# Patient Record
Sex: MALE | Race: WHITE | Employment: OTHER | URBAN - METROPOLITAN AREA
[De-identification: names, ages, dates, MRNs, and addresses within clinical notes are randomized per-mention and may not be internally consistent; named-entity substitution may affect disease eponyms.]

---

## 2021-01-01 ENCOUNTER — HOSPITAL ENCOUNTER (INPATIENT)
Facility: CLINIC | Age: 81
LOS: 3 days | DRG: 871 | End: 2021-09-03
Attending: EMERGENCY MEDICINE | Admitting: INTERNAL MEDICINE
Payer: MEDICARE

## 2021-01-01 ENCOUNTER — APPOINTMENT (OUTPATIENT)
Dept: CT IMAGING | Facility: CLINIC | Age: 81
End: 2021-01-01
Attending: NURSE PRACTITIONER
Payer: MEDICARE

## 2021-01-01 ENCOUNTER — HOSPITAL ENCOUNTER (EMERGENCY)
Facility: CLINIC | Age: 81
Discharge: HOME | End: 2021-05-21
Attending: NURSE PRACTITIONER | Admitting: NURSE PRACTITIONER
Payer: MEDICARE

## 2021-01-01 ENCOUNTER — APPOINTMENT (OUTPATIENT)
Dept: GENERAL RADIOLOGY | Facility: CLINIC | Age: 81
DRG: 871 | End: 2021-01-01
Attending: EMERGENCY MEDICINE
Payer: MEDICARE

## 2021-01-01 ENCOUNTER — MEDICAL CORRESPONDENCE (OUTPATIENT)
Dept: HEALTH INFORMATION MANAGEMENT | Facility: CLINIC | Age: 81
End: 2021-01-01

## 2021-01-01 ENCOUNTER — APPOINTMENT (OUTPATIENT)
Dept: SPEECH THERAPY | Facility: CLINIC | Age: 81
DRG: 871 | End: 2021-01-01
Attending: INTERNAL MEDICINE
Payer: MEDICARE

## 2021-01-01 ENCOUNTER — APPOINTMENT (OUTPATIENT)
Dept: CARDIOLOGY | Facility: CLINIC | Age: 81
DRG: 871 | End: 2021-01-01
Attending: INTERNAL MEDICINE
Payer: MEDICARE

## 2021-01-01 ENCOUNTER — LAB REQUISITION (OUTPATIENT)
Dept: LAB | Facility: CLINIC | Age: 81
End: 2021-01-01
Payer: MEDICARE

## 2021-01-01 VITALS
TEMPERATURE: 97.7 F | HEART RATE: 79 BPM | RESPIRATION RATE: 14 BRPM | OXYGEN SATURATION: 98 % | SYSTOLIC BLOOD PRESSURE: 147 MMHG | DIASTOLIC BLOOD PRESSURE: 74 MMHG

## 2021-01-01 VITALS
TEMPERATURE: 97.8 F | SYSTOLIC BLOOD PRESSURE: 60 MMHG | DIASTOLIC BLOOD PRESSURE: 30 MMHG | OXYGEN SATURATION: 95 % | HEART RATE: 70 BPM | RESPIRATION RATE: 26 BRPM | HEIGHT: 70 IN | BODY MASS INDEX: 17.52 KG/M2 | WEIGHT: 122.36 LBS

## 2021-01-01 DIAGNOSIS — D63.8 ANEMIA IN OTHER CHRONIC DISEASES CLASSIFIED ELSEWHERE: ICD-10-CM

## 2021-01-01 DIAGNOSIS — A41.9 SEPSIS, DUE TO UNSPECIFIED ORGANISM, UNSPECIFIED WHETHER ACUTE ORGAN DYSFUNCTION PRESENT (H): ICD-10-CM

## 2021-01-01 DIAGNOSIS — S00.01XA ABRASION OF SCALP, INITIAL ENCOUNTER: ICD-10-CM

## 2021-01-01 DIAGNOSIS — I95.1 ORTHOSTATIC HYPOTENSION: ICD-10-CM

## 2021-01-01 DIAGNOSIS — E87.29 RESPIRATORY ACIDOSIS: ICD-10-CM

## 2021-01-01 DIAGNOSIS — J96.01 ACUTE RESPIRATORY FAILURE WITH HYPOXIA (H): ICD-10-CM

## 2021-01-01 DIAGNOSIS — H74.91 MASTOID DISORDER, RIGHT: ICD-10-CM

## 2021-01-01 DIAGNOSIS — N17.9 ACUTE KIDNEY INJURY (H): ICD-10-CM

## 2021-01-01 DIAGNOSIS — S09.90XA CLOSED HEAD INJURY, INITIAL ENCOUNTER: ICD-10-CM

## 2021-01-01 DIAGNOSIS — J18.9 PNEUMONIA DUE TO INFECTIOUS ORGANISM, UNSPECIFIED LATERALITY, UNSPECIFIED PART OF LUNG: ICD-10-CM

## 2021-01-01 LAB
ALBUMIN SERPL-MCNC: 2 G/DL (ref 3.5–5)
ALBUMIN UR-MCNC: 20 MG/DL
ALP SERPL-CCNC: 62 U/L (ref 45–120)
ALT SERPL W P-5'-P-CCNC: <9 U/L (ref 0–45)
ANION GAP SERPL CALCULATED.3IONS-SCNC: 4 MMOL/L (ref 3–14)
ANION GAP SERPL CALCULATED.3IONS-SCNC: 4 MMOL/L (ref 3–14)
ANION GAP SERPL CALCULATED.3IONS-SCNC: 5 MMOL/L (ref 3–14)
ANION GAP SERPL CALCULATED.3IONS-SCNC: 8 MMOL/L (ref 3–14)
ANION GAP SERPL CALCULATED.3IONS-SCNC: 9 MMOL/L (ref 5–18)
APPEARANCE UR: CLEAR
APTT PPP: 36 SECONDS (ref 22–38)
AST SERPL W P-5'-P-CCNC: 7 U/L (ref 0–40)
BACTERIA #/AREA URNS HPF: ABNORMAL /HPF
BACTERIA BLD CULT: ABNORMAL
BACTERIA BLD CULT: ABNORMAL
BACTERIA UR CULT: NO GROWTH
BACTERIA UR CULT: NO GROWTH
BASE EXCESS BLDA CALC-SCNC: -4.7 MMOL/L (ref -9–1.8)
BASE EXCESS BLDA CALC-SCNC: -5.2 MMOL/L (ref -9–1.8)
BASE EXCESS BLDV CALC-SCNC: -4.9 MMOL/L (ref -7.7–1.9)
BASOPHILS # BLD AUTO: 0 10E3/UL (ref 0–0.2)
BASOPHILS # BLD AUTO: 0 10E3/UL (ref 0–0.2)
BASOPHILS NFR BLD AUTO: 0 %
BASOPHILS NFR BLD AUTO: 1 %
BILIRUB SERPL-MCNC: 0.5 MG/DL (ref 0–1)
BILIRUB UR QL STRIP: NEGATIVE
BUN SERPL-MCNC: 44 MG/DL (ref 7–30)
BUN SERPL-MCNC: 45 MG/DL (ref 7–30)
BUN SERPL-MCNC: 45 MG/DL (ref 7–30)
BUN SERPL-MCNC: 47 MG/DL (ref 7–30)
BUN SERPL-MCNC: 52 MG/DL (ref 8–28)
CALCIUM SERPL-MCNC: 8.1 MG/DL (ref 8.5–10.1)
CALCIUM SERPL-MCNC: 8.3 MG/DL (ref 8.5–10.5)
CALCIUM SERPL-MCNC: 8.5 MG/DL (ref 8.5–10.1)
CALCIUM SERPL-MCNC: 8.5 MG/DL (ref 8.5–10.1)
CALCIUM SERPL-MCNC: 8.6 MG/DL (ref 8.5–10.1)
CHLORIDE BLD-SCNC: 108 MMOL/L (ref 98–107)
CHLORIDE BLD-SCNC: 112 MMOL/L (ref 94–109)
CHLORIDE BLD-SCNC: 117 MMOL/L (ref 94–109)
CHLORIDE BLD-SCNC: 118 MMOL/L (ref 94–109)
CHLORIDE BLD-SCNC: 119 MMOL/L (ref 94–109)
CO2 SERPL-SCNC: 20 MMOL/L (ref 20–32)
CO2 SERPL-SCNC: 21 MMOL/L (ref 22–31)
CO2 SERPL-SCNC: 22 MMOL/L (ref 20–32)
CO2 SERPL-SCNC: 23 MMOL/L (ref 20–32)
CO2 SERPL-SCNC: 25 MMOL/L (ref 20–32)
COLOR UR AUTO: ABNORMAL
CREAT SERPL-MCNC: 2.71 MG/DL (ref 0.66–1.25)
CREAT SERPL-MCNC: 2.79 MG/DL (ref 0.66–1.25)
CREAT SERPL-MCNC: 2.86 MG/DL (ref 0.66–1.25)
CREAT SERPL-MCNC: 2.88 MG/DL (ref 0.66–1.25)
CREAT SERPL-MCNC: 3.03 MG/DL (ref 0.7–1.3)
ENTEROCOCCUS FAECALIS: NOT DETECTED
ENTEROCOCCUS FAECIUM: NOT DETECTED
EOSINOPHIL # BLD AUTO: 0 10E3/UL (ref 0–0.7)
EOSINOPHIL # BLD AUTO: 0.1 10E3/UL (ref 0–0.7)
EOSINOPHIL NFR BLD AUTO: 0 %
EOSINOPHIL NFR BLD AUTO: 2 %
ERYTHROCYTE [DISTWIDTH] IN BLOOD BY AUTOMATED COUNT: 12.8 % (ref 10–15)
ERYTHROCYTE [DISTWIDTH] IN BLOOD BY AUTOMATED COUNT: 14 % (ref 10–15)
ERYTHROCYTE [DISTWIDTH] IN BLOOD BY AUTOMATED COUNT: 14.3 % (ref 10–15)
ERYTHROCYTE [DISTWIDTH] IN BLOOD BY AUTOMATED COUNT: 14.4 % (ref 10–15)
ERYTHROCYTE [DISTWIDTH] IN BLOOD BY AUTOMATED COUNT: 14.4 % (ref 10–15)
FERRITIN SERPL-MCNC: 379 NG/ML (ref 27–300)
GFR SERPL CREATININE-BSD FRML MDRD: 18 ML/MIN/1.73M2
GFR SERPL CREATININE-BSD FRML MDRD: 19 ML/MIN/1.73M2
GFR SERPL CREATININE-BSD FRML MDRD: 19 ML/MIN/1.73M2
GFR SERPL CREATININE-BSD FRML MDRD: 20 ML/MIN/1.73M2
GFR SERPL CREATININE-BSD FRML MDRD: 21 ML/MIN/1.73M2
GLUCOSE BLD-MCNC: 63 MG/DL (ref 70–99)
GLUCOSE BLD-MCNC: 67 MG/DL (ref 70–125)
GLUCOSE BLD-MCNC: 96 MG/DL (ref 70–99)
GLUCOSE BLD-MCNC: 99 MG/DL (ref 70–99)
GLUCOSE BLD-MCNC: 99 MG/DL (ref 70–99)
GLUCOSE BLDC GLUCOMTR-MCNC: 64 MG/DL (ref 70–99)
GLUCOSE BLDC GLUCOMTR-MCNC: 80 MG/DL (ref 70–99)
GLUCOSE UR STRIP-MCNC: NEGATIVE MG/DL
HCO3 BLD-SCNC: 20 MMOL/L (ref 21–28)
HCO3 BLD-SCNC: 21 MMOL/L (ref 21–28)
HCO3 BLDV-SCNC: 17 MMOL/L (ref 21–28)
HCO3 BLDV-SCNC: 21 MMOL/L (ref 21–28)
HCO3 BLDV-SCNC: 23 MMOL/L (ref 21–28)
HCO3 BLDV-SCNC: 24 MMOL/L (ref 21–28)
HCT VFR BLD AUTO: 24.4 % (ref 40–53)
HCT VFR BLD AUTO: 25.3 % (ref 40–53)
HCT VFR BLD AUTO: 27.5 % (ref 40–53)
HCT VFR BLD AUTO: 27.7 % (ref 40–53)
HCT VFR BLD AUTO: 28.1 % (ref 40–53)
HGB BLD-MCNC: 7.8 G/DL (ref 13.3–17.7)
HGB BLD-MCNC: 8.3 G/DL (ref 13.3–17.7)
HGB BLD-MCNC: 8.6 G/DL (ref 13.3–17.7)
HGB BLD-MCNC: 8.9 G/DL (ref 13.3–17.7)
HGB BLD-MCNC: 9 G/DL (ref 13.3–17.7)
HGB UR QL STRIP: ABNORMAL
IMM GRANULOCYTES # BLD: 0 10E3/UL
IMM GRANULOCYTES # BLD: 0 10E3/UL
IMM GRANULOCYTES NFR BLD: 0 %
IMM GRANULOCYTES NFR BLD: 0 %
INR PPP: 1.33 (ref 0.85–1.15)
IRON SATN MFR SERPL: 52 % (ref 20–50)
IRON SERPL-MCNC: 41 UG/DL (ref 42–175)
KETONES UR STRIP-MCNC: NEGATIVE MG/DL
LACTATE BLD-SCNC: 0.7 MMOL/L
LACTATE BLD-SCNC: 1 MMOL/L
LACTATE BLD-SCNC: 1.1 MMOL/L
LACTATE SERPL-SCNC: 1.2 MMOL/L (ref 0.7–2)
LACTATE SERPL-SCNC: 1.6 MMOL/L (ref 0.7–2)
LEUKOCYTE ESTERASE UR QL STRIP: NEGATIVE
LISTERIA SPECIES (DETECTED/NOT DETECTED): NOT DETECTED
LVEF ECHO: NORMAL
LYMPHOCYTES # BLD AUTO: 0.7 10E3/UL (ref 0.8–5.3)
LYMPHOCYTES # BLD AUTO: 0.8 10E3/UL (ref 0.8–5.3)
LYMPHOCYTES NFR BLD AUTO: 13 %
LYMPHOCYTES NFR BLD AUTO: 17 %
MAGNESIUM SERPL-MCNC: 1.7 MG/DL (ref 1.6–2.3)
MCH RBC QN AUTO: 31 PG (ref 26.5–33)
MCH RBC QN AUTO: 31.3 PG (ref 26.5–33)
MCH RBC QN AUTO: 31.3 PG (ref 26.5–33)
MCH RBC QN AUTO: 31.5 PG (ref 26.5–33)
MCH RBC QN AUTO: 31.7 PG (ref 26.5–33)
MCHC RBC AUTO-ENTMCNC: 31 G/DL (ref 31.5–36.5)
MCHC RBC AUTO-ENTMCNC: 32 G/DL (ref 31.5–36.5)
MCHC RBC AUTO-ENTMCNC: 32 G/DL (ref 31.5–36.5)
MCHC RBC AUTO-ENTMCNC: 32.4 G/DL (ref 31.5–36.5)
MCHC RBC AUTO-ENTMCNC: 32.8 G/DL (ref 31.5–36.5)
MCV RBC AUTO: 100 FL (ref 78–100)
MCV RBC AUTO: 96 FL (ref 78–100)
MCV RBC AUTO: 98 FL (ref 78–100)
MONOCYTES # BLD AUTO: 0.2 10E3/UL (ref 0–1.3)
MONOCYTES # BLD AUTO: 0.3 10E3/UL (ref 0–1.3)
MONOCYTES NFR BLD AUTO: 4 %
MONOCYTES NFR BLD AUTO: 7 %
MUCOUS THREADS #/AREA URNS LPF: PRESENT /LPF
NEUTROPHILS # BLD AUTO: 3.1 10E3/UL (ref 1.6–8.3)
NEUTROPHILS # BLD AUTO: 4.8 10E3/UL (ref 1.6–8.3)
NEUTROPHILS NFR BLD AUTO: 73 %
NEUTROPHILS NFR BLD AUTO: 83 %
NITRATE UR QL: NEGATIVE
NRBC # BLD AUTO: 0 10E3/UL
NRBC # BLD AUTO: 0 10E3/UL
NRBC BLD AUTO-RTO: 0 /100
NRBC BLD AUTO-RTO: 0 /100
NT-PROBNP SERPL-MCNC: ABNORMAL PG/ML (ref 0–1800)
NT-PROBNP SERPL-MCNC: ABNORMAL PG/ML (ref 0–1800)
O2/TOTAL GAS SETTING VFR VENT: 2 %
O2/TOTAL GAS SETTING VFR VENT: 3 %
O2/TOTAL GAS SETTING VFR VENT: 30 %
OXYHGB MFR BLD: 96 % (ref 92–100)
OXYHGB MFR BLDV: 40 % (ref 70–75)
PCO2 BLD: 36 MM HG (ref 35–45)
PCO2 BLD: 38 MM HG (ref 35–45)
PCO2 BLDV: 37 MM HG (ref 40–50)
PCO2 BLDV: 41 MM HG (ref 40–50)
PCO2 BLDV: 49 MM HG (ref 40–50)
PCO2 BLDV: 64 MM HG (ref 40–50)
PH BLD: 7.34 [PH] (ref 7.35–7.45)
PH BLD: 7.35 [PH] (ref 7.35–7.45)
PH BLDV: 7.17 [PH] (ref 7.32–7.43)
PH BLDV: 7.27 [PH] (ref 7.32–7.43)
PH BLDV: 7.29 [PH] (ref 7.32–7.43)
PH BLDV: 7.32 [PH] (ref 7.32–7.43)
PH UR STRIP: 5 [PH] (ref 5–7)
PHOSPHATE SERPL-MCNC: 4.5 MG/DL (ref 2.5–4.5)
PLATELET # BLD AUTO: 137 10E3/UL (ref 150–450)
PLATELET # BLD AUTO: 142 10E3/UL (ref 150–450)
PLATELET # BLD AUTO: 145 10E3/UL (ref 150–450)
PLATELET # BLD AUTO: 151 10E3/UL (ref 150–450)
PLATELET # BLD AUTO: 164 10E3/UL (ref 150–450)
PO2 BLD: 110 MM HG (ref 80–105)
PO2 BLD: 74 MM HG (ref 80–105)
PO2 BLDV: 25 MM HG (ref 25–47)
PO2 BLDV: 25 MM HG (ref 25–47)
PO2 BLDV: 34 MM HG (ref 25–47)
PO2 BLDV: 52 MM HG (ref 25–47)
POTASSIUM BLD-SCNC: 3.4 MMOL/L (ref 3.4–5.3)
POTASSIUM BLD-SCNC: 3.5 MMOL/L (ref 3.4–5.3)
POTASSIUM BLD-SCNC: 3.8 MMOL/L (ref 3.4–5.3)
POTASSIUM BLD-SCNC: 3.8 MMOL/L (ref 3.5–5)
POTASSIUM BLD-SCNC: 3.9 MMOL/L (ref 3.4–5.3)
PROCALCITONIN SERPL-MCNC: 0.43 NG/ML
PROT SERPL-MCNC: 4.8 G/DL (ref 6–8)
RBC # BLD AUTO: 2.48 10E6/UL (ref 4.4–5.9)
RBC # BLD AUTO: 2.65 10E6/UL (ref 4.4–5.9)
RBC # BLD AUTO: 2.77 10E6/UL (ref 4.4–5.9)
RBC # BLD AUTO: 2.81 10E6/UL (ref 4.4–5.9)
RBC # BLD AUTO: 2.88 10E6/UL (ref 4.4–5.9)
RBC URINE: 8 /HPF
RETICS # AUTO: 0.02 10E6/UL (ref 0.01–0.11)
RETICS/RBC NFR AUTO: 0.9 % (ref 0.8–2.7)
SAO2 % BLDV: 37 % (ref 94–100)
SAO2 % BLDV: 59 % (ref 94–100)
SAO2 % BLDV: 75 % (ref 94–100)
SARS-COV-2 RNA RESP QL NAA+PROBE: NEGATIVE
SODIUM SERPL-SCNC: 138 MMOL/L (ref 136–145)
SODIUM SERPL-SCNC: 141 MMOL/L (ref 133–144)
SODIUM SERPL-SCNC: 144 MMOL/L (ref 133–144)
SODIUM SERPL-SCNC: 146 MMOL/L (ref 133–144)
SODIUM SERPL-SCNC: 146 MMOL/L (ref 133–144)
SP GR UR STRIP: 1.01 (ref 1–1.03)
SQUAMOUS EPITHELIAL: 3 /HPF
STAPHYLOCOCCUS AUREUS: DETECTED
STAPHYLOCOCCUS EPIDERMIDIS: NOT DETECTED
STAPHYLOCOCCUS LUGDUNENSIS: NOT DETECTED
STREPTOCOCCUS AGALACTIAE: NOT DETECTED
STREPTOCOCCUS ANGINOSUS GROUP: NOT DETECTED
STREPTOCOCCUS PNEUMONIAE: NOT DETECTED
STREPTOCOCCUS PYOGENES: NOT DETECTED
STREPTOCOCCUS SPECIES: NOT DETECTED
TIBC SERPL-MCNC: 79 UG/DL (ref 313–563)
TRANSFERRIN SERPL-MCNC: 63 MG/DL (ref 212–360)
TROPONIN I SERPL-MCNC: 0.08 UG/L (ref 0–0.04)
TROPONIN I SERPL-MCNC: 0.15 UG/L (ref 0–0.04)
TROPONIN I SERPL-MCNC: 0.19 UG/L (ref 0–0.04)
TROPONIN I SERPL-MCNC: 0.27 UG/L (ref 0–0.04)
UROBILINOGEN UR STRIP-MCNC: NORMAL MG/DL
VANCOMYCIN SERPL-MCNC: 10.6 MG/L
WBC # BLD AUTO: 4.2 10E3/UL (ref 4–11)
WBC # BLD AUTO: 5.8 10E3/UL (ref 4–11)
WBC # BLD AUTO: 7.9 10E3/UL (ref 4–11)
WBC # BLD AUTO: 8.9 10E3/UL (ref 4–11)
WBC # BLD AUTO: 9.6 10E3/UL (ref 4–11)
WBC URINE: 12 /HPF

## 2021-01-01 PROCEDURE — 250N000011 HC RX IP 250 OP 636: Performed by: NURSE PRACTITIONER

## 2021-01-01 PROCEDURE — 93306 TTE W/DOPPLER COMPLETE: CPT

## 2021-01-01 PROCEDURE — 80048 BASIC METABOLIC PNL TOTAL CA: CPT | Performed by: EMERGENCY MEDICINE

## 2021-01-01 PROCEDURE — 80048 BASIC METABOLIC PNL TOTAL CA: CPT | Performed by: NURSE PRACTITIONER

## 2021-01-01 PROCEDURE — 250N000011 HC RX IP 250 OP 636: Performed by: HOSPITALIST

## 2021-01-01 PROCEDURE — 94660 CPAP INITIATION&MGMT: CPT

## 2021-01-01 PROCEDURE — 87040 BLOOD CULTURE FOR BACTERIA: CPT | Performed by: INTERNAL MEDICINE

## 2021-01-01 PROCEDURE — 5A09357 ASSISTANCE WITH RESPIRATORY VENTILATION, LESS THAN 24 CONSECUTIVE HOURS, CONTINUOUS POSITIVE AIRWAY PRESSURE: ICD-10-PCS | Performed by: INTERNAL MEDICINE

## 2021-01-01 PROCEDURE — 90715 TDAP VACCINE 7 YRS/> IM: CPT | Performed by: NURSE PRACTITIONER

## 2021-01-01 PROCEDURE — 250N000013 HC RX MED GY IP 250 OP 250 PS 637: Performed by: INTERNAL MEDICINE

## 2021-01-01 PROCEDURE — 70450 CT HEAD/BRAIN W/O DYE: CPT

## 2021-01-01 PROCEDURE — C9803 HOPD COVID-19 SPEC COLLECT: HCPCS

## 2021-01-01 PROCEDURE — 250N000013 HC RX MED GY IP 250 OP 250 PS 637: Performed by: HOSPITALIST

## 2021-01-01 PROCEDURE — 85045 AUTOMATED RETICULOCYTE COUNT: CPT | Mod: ORL | Performed by: HOSPITALIST

## 2021-01-01 PROCEDURE — 250N000011 HC RX IP 250 OP 636: Performed by: INTERNAL MEDICINE

## 2021-01-01 PROCEDURE — 84484 ASSAY OF TROPONIN QUANT: CPT | Performed by: INTERNAL MEDICINE

## 2021-01-01 PROCEDURE — 93005 ELECTROCARDIOGRAM TRACING: CPT

## 2021-01-01 PROCEDURE — 82728 ASSAY OF FERRITIN: CPT | Mod: ORL | Performed by: HOSPITALIST

## 2021-01-01 PROCEDURE — 99292 CRITICAL CARE ADDL 30 MIN: CPT

## 2021-01-01 PROCEDURE — 71045 X-RAY EXAM CHEST 1 VIEW: CPT

## 2021-01-01 PROCEDURE — 36415 COLL VENOUS BLD VENIPUNCTURE: CPT | Performed by: INTERNAL MEDICINE

## 2021-01-01 PROCEDURE — 93306 TTE W/DOPPLER COMPLETE: CPT | Mod: 26 | Performed by: INTERNAL MEDICINE

## 2021-01-01 PROCEDURE — 85025 COMPLETE CBC W/AUTO DIFF WBC: CPT | Performed by: EMERGENCY MEDICINE

## 2021-01-01 PROCEDURE — 250N000011 HC RX IP 250 OP 636: Performed by: SPECIALIST

## 2021-01-01 PROCEDURE — 87149 DNA/RNA DIRECT PROBE: CPT | Performed by: EMERGENCY MEDICINE

## 2021-01-01 PROCEDURE — 84466 ASSAY OF TRANSFERRIN: CPT | Mod: ORL | Performed by: HOSPITALIST

## 2021-01-01 PROCEDURE — 99233 SBSQ HOSP IP/OBS HIGH 50: CPT | Performed by: INTERNAL MEDICINE

## 2021-01-01 PROCEDURE — P9604 ONE-WAY ALLOW PRORATED TRIP: HCPCS | Mod: ORL | Performed by: HOSPITALIST

## 2021-01-01 PROCEDURE — 999N000157 HC STATISTIC RCP TIME EA 10 MIN

## 2021-01-01 PROCEDURE — 258N000003 HC RX IP 258 OP 636: Performed by: INTERNAL MEDICINE

## 2021-01-01 PROCEDURE — 120N000001 HC R&B MED SURG/OB

## 2021-01-01 PROCEDURE — 80202 ASSAY OF VANCOMYCIN: CPT | Performed by: INTERNAL MEDICINE

## 2021-01-01 PROCEDURE — 85025 COMPLETE CBC W/AUTO DIFF WBC: CPT | Mod: ORL | Performed by: HOSPITALIST

## 2021-01-01 PROCEDURE — 85610 PROTHROMBIN TIME: CPT | Performed by: EMERGENCY MEDICINE

## 2021-01-01 PROCEDURE — 272N000064 HC CIRCUIT HUMIDITY W/CPAP BIPAP

## 2021-01-01 PROCEDURE — 258N000003 HC RX IP 258 OP 636: Performed by: NURSE PRACTITIONER

## 2021-01-01 PROCEDURE — 83605 ASSAY OF LACTIC ACID: CPT | Performed by: EMERGENCY MEDICINE

## 2021-01-01 PROCEDURE — 99223 1ST HOSP IP/OBS HIGH 75: CPT | Mod: AI | Performed by: INTERNAL MEDICINE

## 2021-01-01 PROCEDURE — 84145 PROCALCITONIN (PCT): CPT | Performed by: EMERGENCY MEDICINE

## 2021-01-01 PROCEDURE — 36415 COLL VENOUS BLD VENIPUNCTURE: CPT | Performed by: NURSE PRACTITIONER

## 2021-01-01 PROCEDURE — 87635 SARS-COV-2 COVID-19 AMP PRB: CPT | Performed by: EMERGENCY MEDICINE

## 2021-01-01 PROCEDURE — 85730 THROMBOPLASTIN TIME PARTIAL: CPT | Performed by: EMERGENCY MEDICINE

## 2021-01-01 PROCEDURE — G0463 HOSPITAL OUTPT CLINIC VISIT: HCPCS

## 2021-01-01 PROCEDURE — 90471 IMMUNIZATION ADMIN: CPT | Performed by: NURSE PRACTITIONER

## 2021-01-01 PROCEDURE — 99291 CRITICAL CARE FIRST HOUR: CPT | Performed by: NURSE PRACTITIONER

## 2021-01-01 PROCEDURE — 84484 ASSAY OF TROPONIN QUANT: CPT | Performed by: NURSE PRACTITIONER

## 2021-01-01 PROCEDURE — 82803 BLOOD GASES ANY COMBINATION: CPT

## 2021-01-01 PROCEDURE — 84484 ASSAY OF TROPONIN QUANT: CPT | Performed by: EMERGENCY MEDICINE

## 2021-01-01 PROCEDURE — 99284 EMERGENCY DEPT VISIT MOD MDM: CPT | Mod: 25

## 2021-01-01 PROCEDURE — 96365 THER/PROPH/DIAG IV INF INIT: CPT

## 2021-01-01 PROCEDURE — 85027 COMPLETE CBC AUTOMATED: CPT | Performed by: INTERNAL MEDICINE

## 2021-01-01 PROCEDURE — 83880 ASSAY OF NATRIURETIC PEPTIDE: CPT | Performed by: EMERGENCY MEDICINE

## 2021-01-01 PROCEDURE — 80048 BASIC METABOLIC PNL TOTAL CA: CPT | Performed by: INTERNAL MEDICINE

## 2021-01-01 PROCEDURE — 83735 ASSAY OF MAGNESIUM: CPT | Performed by: NURSE PRACTITIONER

## 2021-01-01 PROCEDURE — 258N000003 HC RX IP 258 OP 636: Performed by: EMERGENCY MEDICINE

## 2021-01-01 PROCEDURE — 87086 URINE CULTURE/COLONY COUNT: CPT | Performed by: EMERGENCY MEDICINE

## 2021-01-01 PROCEDURE — 81001 URINALYSIS AUTO W/SCOPE: CPT | Performed by: EMERGENCY MEDICINE

## 2021-01-01 PROCEDURE — 36415 COLL VENOUS BLD VENIPUNCTURE: CPT | Performed by: EMERGENCY MEDICINE

## 2021-01-01 PROCEDURE — 99292 CRITICAL CARE ADDL 30 MIN: CPT | Performed by: NURSE PRACTITIONER

## 2021-01-01 PROCEDURE — 92610 EVALUATE SWALLOWING FUNCTION: CPT | Mod: GN

## 2021-01-01 PROCEDURE — 36600 WITHDRAWAL OF ARTERIAL BLOOD: CPT

## 2021-01-01 PROCEDURE — 80053 COMPREHEN METABOLIC PANEL: CPT | Mod: ORL | Performed by: HOSPITALIST

## 2021-01-01 PROCEDURE — 82803 BLOOD GASES ANY COMBINATION: CPT | Performed by: INTERNAL MEDICINE

## 2021-01-01 PROCEDURE — 82805 BLOOD GASES W/O2 SATURATION: CPT | Performed by: INTERNAL MEDICINE

## 2021-01-01 PROCEDURE — 250N000009 HC RX 250: Performed by: NURSE PRACTITIONER

## 2021-01-01 PROCEDURE — 83605 ASSAY OF LACTIC ACID: CPT | Performed by: NURSE PRACTITIONER

## 2021-01-01 PROCEDURE — 87186 SC STD MICRODIL/AGAR DIL: CPT | Performed by: EMERGENCY MEDICINE

## 2021-01-01 PROCEDURE — 250N000011 HC RX IP 250 OP 636: Performed by: EMERGENCY MEDICINE

## 2021-01-01 PROCEDURE — 258N000001 HC RX 258: Performed by: INTERNAL MEDICINE

## 2021-01-01 PROCEDURE — 84100 ASSAY OF PHOSPHORUS: CPT | Performed by: NURSE PRACTITIONER

## 2021-01-01 PROCEDURE — 99291 CRITICAL CARE FIRST HOUR: CPT | Mod: 25

## 2021-01-01 PROCEDURE — 85014 HEMATOCRIT: CPT | Performed by: INTERNAL MEDICINE

## 2021-01-01 PROCEDURE — 36415 COLL VENOUS BLD VENIPUNCTURE: CPT | Mod: ORL | Performed by: HOSPITALIST

## 2021-01-01 RX ORDER — NITROGLYCERIN 0.4 MG/1
0.4 TABLET SUBLINGUAL EVERY 5 MIN PRN
Status: DISCONTINUED | OUTPATIENT
Start: 2021-01-01 | End: 2021-01-01 | Stop reason: HOSPADM

## 2021-01-01 RX ORDER — CARBIDOPA AND LEVODOPA 25; 100 MG/1; MG/1
1.5 TABLET, ORALLY DISINTEGRATING ORAL 4 TIMES DAILY
COMMUNITY
Start: 2021-01-01

## 2021-01-01 RX ORDER — METOPROLOL TARTRATE 1 MG/ML
2.5 INJECTION, SOLUTION INTRAVENOUS ONCE
Status: COMPLETED | OUTPATIENT
Start: 2021-01-01 | End: 2021-01-01

## 2021-01-01 RX ORDER — MAGNESIUM SULFATE HEPTAHYDRATE 40 MG/ML
2 INJECTION, SOLUTION INTRAVENOUS ONCE
Status: COMPLETED | OUTPATIENT
Start: 2021-01-01 | End: 2021-01-01

## 2021-01-01 RX ORDER — CALCITRIOL 0.25 UG/1
CAPSULE, LIQUID FILLED ORAL
COMMUNITY
Start: 2020-04-28 | End: 2021-01-01

## 2021-01-01 RX ORDER — FERROUS SULFATE 325(65) MG
325 TABLET ORAL
COMMUNITY
Start: 2021-01-01

## 2021-01-01 RX ORDER — DEXTROSE MONOHYDRATE 50 MG/ML
INJECTION, SOLUTION INTRAVENOUS CONTINUOUS
Status: DISCONTINUED | OUTPATIENT
Start: 2021-01-01 | End: 2021-01-01 | Stop reason: HOSPADM

## 2021-01-01 RX ORDER — SENNOSIDES A AND B 8.6 MG/1
8.6 TABLET, FILM COATED ORAL DAILY PRN
COMMUNITY

## 2021-01-01 RX ORDER — FLUTICASONE PROPIONATE 50 MCG
2 SPRAY, SUSPENSION (ML) NASAL DAILY
COMMUNITY
Start: 2021-01-01

## 2021-01-01 RX ORDER — BISACODYL 10 MG
10 SUPPOSITORY, RECTAL RECTAL DAILY PRN
Status: DISCONTINUED | OUTPATIENT
Start: 2021-01-01 | End: 2021-01-01 | Stop reason: HOSPADM

## 2021-01-01 RX ORDER — ONDANSETRON 4 MG/1
4 TABLET, ORALLY DISINTEGRATING ORAL EVERY 6 HOURS PRN
Status: DISCONTINUED | OUTPATIENT
Start: 2021-01-01 | End: 2021-01-01 | Stop reason: HOSPADM

## 2021-01-01 RX ORDER — VANCOMYCIN HYDROCHLORIDE 1 G/200ML
1000 INJECTION, SOLUTION INTRAVENOUS ONCE
Status: COMPLETED | OUTPATIENT
Start: 2021-01-01 | End: 2021-01-01

## 2021-01-01 RX ORDER — ONDANSETRON 2 MG/ML
4 INJECTION INTRAMUSCULAR; INTRAVENOUS EVERY 6 HOURS PRN
Status: DISCONTINUED | OUTPATIENT
Start: 2021-01-01 | End: 2021-01-01 | Stop reason: HOSPADM

## 2021-01-01 RX ORDER — FLUTICASONE PROPIONATE 50 MCG
2 SPRAY, SUSPENSION (ML) NASAL DAILY
Status: DISCONTINUED | OUTPATIENT
Start: 2021-01-01 | End: 2021-01-01 | Stop reason: HOSPADM

## 2021-01-01 RX ORDER — LIDOCAINE 40 MG/G
CREAM TOPICAL
Status: DISCONTINUED | OUTPATIENT
Start: 2021-01-01 | End: 2021-01-01

## 2021-01-01 RX ORDER — CARBIDOPA AND LEVODOPA 25; 100 MG/1; MG/1
1 TABLET, ORALLY DISINTEGRATING ORAL 4 TIMES DAILY
Status: DISCONTINUED | OUTPATIENT
Start: 2021-01-01 | End: 2021-01-01 | Stop reason: HOSPADM

## 2021-01-01 RX ORDER — ACETAMINOPHEN 500 MG
500 TABLET ORAL EVERY 8 HOURS PRN
COMMUNITY

## 2021-01-01 RX ORDER — AMOXICILLIN 250 MG
1-2 CAPSULE ORAL 2 TIMES DAILY PRN
Status: DISCONTINUED | OUTPATIENT
Start: 2021-01-01 | End: 2021-01-01 | Stop reason: HOSPADM

## 2021-01-01 RX ORDER — OLANZAPINE 10 MG/2ML
5 INJECTION, POWDER, FOR SOLUTION INTRAMUSCULAR
Status: COMPLETED | OUTPATIENT
Start: 2021-01-01 | End: 2021-01-01

## 2021-01-01 RX ORDER — LANOLIN ALCOHOL/MO/W.PET/CERES
3 CREAM (GRAM) TOPICAL AT BEDTIME
COMMUNITY
Start: 2021-01-01

## 2021-01-01 RX ORDER — HEPARIN SODIUM 5000 [USP'U]/.5ML
5000 INJECTION, SOLUTION INTRAVENOUS; SUBCUTANEOUS EVERY 12 HOURS
Status: DISCONTINUED | OUTPATIENT
Start: 2021-01-01 | End: 2021-01-01 | Stop reason: HOSPADM

## 2021-01-01 RX ORDER — SODIUM CHLORIDE 9 MG/ML
INJECTION, SOLUTION INTRAVENOUS CONTINUOUS
Status: DISCONTINUED | OUTPATIENT
Start: 2021-01-01 | End: 2021-01-01

## 2021-01-01 RX ORDER — AMPICILLIN AND SULBACTAM 2; 1 G/1; G/1
3 INJECTION, POWDER, FOR SOLUTION INTRAMUSCULAR; INTRAVENOUS EVERY 12 HOURS
Status: DISCONTINUED | OUTPATIENT
Start: 2021-01-01 | End: 2021-01-01 | Stop reason: HOSPADM

## 2021-01-01 RX ORDER — CARBOXYMETHYLCELLULOSE SODIUM 5 MG/ML
1 SOLUTION/ DROPS OPHTHALMIC
Status: DISCONTINUED | OUTPATIENT
Start: 2021-01-01 | End: 2021-01-01 | Stop reason: HOSPADM

## 2021-01-01 RX ORDER — PIPERACILLIN SODIUM, TAZOBACTAM SODIUM 4; .5 G/20ML; G/20ML
4.5 INJECTION, POWDER, LYOPHILIZED, FOR SOLUTION INTRAVENOUS ONCE
Status: COMPLETED | OUTPATIENT
Start: 2021-01-01 | End: 2021-01-01

## 2021-01-01 RX ORDER — OXYBUTYNIN CHLORIDE 5 MG/1
5 TABLET ORAL
COMMUNITY
Start: 2020-02-03

## 2021-01-01 RX ORDER — FERROUS SULFATE 325(65) MG
325 TABLET ORAL DAILY
Status: DISCONTINUED | OUTPATIENT
Start: 2021-01-01 | End: 2021-01-01 | Stop reason: HOSPADM

## 2021-01-01 RX ORDER — PIPERACILLIN SODIUM, TAZOBACTAM SODIUM 2; .25 G/10ML; G/10ML
2.25 INJECTION, POWDER, LYOPHILIZED, FOR SOLUTION INTRAVENOUS EVERY 6 HOURS
Status: DISCONTINUED | OUTPATIENT
Start: 2021-01-01 | End: 2021-01-01

## 2021-01-01 RX ORDER — OLANZAPINE 2.5 MG/1
2.5-5 TABLET, FILM COATED ORAL
Status: DISCONTINUED | OUTPATIENT
Start: 2021-01-01 | End: 2021-01-01 | Stop reason: HOSPADM

## 2021-01-01 RX ORDER — CARBIDOPA AND LEVODOPA 10; 100 MG/1; MG/1
1 TABLET, ORALLY DISINTEGRATING ORAL 4 TIMES DAILY
Status: DISCONTINUED | OUTPATIENT
Start: 2021-01-01 | End: 2021-01-01

## 2021-01-01 RX ORDER — LIDOCAINE 40 MG/G
CREAM TOPICAL
Status: DISCONTINUED | OUTPATIENT
Start: 2021-01-01 | End: 2021-01-01 | Stop reason: HOSPADM

## 2021-01-01 RX ORDER — LANOLIN ALCOHOL/MO/W.PET/CERES
3 CREAM (GRAM) TOPICAL
COMMUNITY

## 2021-01-01 RX ORDER — AMPICILLIN AND SULBACTAM 2; 1 G/1; G/1
3 INJECTION, POWDER, FOR SOLUTION INTRAMUSCULAR; INTRAVENOUS EVERY 24 HOURS
Status: DISCONTINUED | OUTPATIENT
Start: 2021-01-01 | End: 2021-01-01

## 2021-01-01 RX ORDER — CARBIDOPA AND LEVODOPA 25; 100 MG/1; MG/1
1.5 TABLET, ORALLY DISINTEGRATING ORAL 4 TIMES DAILY
Status: DISCONTINUED | OUTPATIENT
Start: 2021-01-01 | End: 2021-01-01 | Stop reason: ALTCHOICE

## 2021-01-01 RX ORDER — POTASSIUM CHLORIDE 7.45 MG/ML
10 INJECTION INTRAVENOUS
Status: COMPLETED | OUTPATIENT
Start: 2021-01-01 | End: 2021-01-01

## 2021-01-01 RX ORDER — OMEPRAZOLE 40 MG/1
40 CAPSULE, DELAYED RELEASE ORAL
COMMUNITY
Start: 2021-01-01

## 2021-01-01 RX ORDER — ACETAMINOPHEN 650 MG/1
650 SUPPOSITORY RECTAL EVERY 6 HOURS PRN
Status: DISCONTINUED | OUTPATIENT
Start: 2021-01-01 | End: 2021-01-01 | Stop reason: HOSPADM

## 2021-01-01 RX ADMIN — HEPARIN SODIUM 5000 UNITS: 5000 INJECTION INTRAVENOUS; SUBCUTANEOUS at 08:34

## 2021-01-01 RX ADMIN — PIPERACILLIN SODIUM AND TAZOBACTAM SODIUM 2.25 G: 2; .25 INJECTION, POWDER, LYOPHILIZED, FOR SOLUTION INTRAVENOUS at 20:56

## 2021-01-01 RX ADMIN — PIPERACILLIN SODIUM AND TAZOBACTAM SODIUM 2.25 G: 2; .25 INJECTION, POWDER, LYOPHILIZED, FOR SOLUTION INTRAVENOUS at 02:19

## 2021-01-01 RX ADMIN — OLANZAPINE 5 MG: 10 INJECTION, POWDER, FOR SOLUTION INTRAMUSCULAR at 21:24

## 2021-01-01 RX ADMIN — AMPICILLIN SODIUM AND SULBACTAM SODIUM 3 G: 2; 1 INJECTION, POWDER, FOR SOLUTION INTRAMUSCULAR; INTRAVENOUS at 17:15

## 2021-01-01 RX ADMIN — VANCOMYCIN HYDROCHLORIDE 1000 MG: 1 INJECTION, SOLUTION INTRAVENOUS at 09:56

## 2021-01-01 RX ADMIN — CARBIDOPA AND LEVODOPA 1 TABLET: 25; 100 TABLET, ORALLY DISINTEGRATING ORAL at 21:43

## 2021-01-01 RX ADMIN — POTASSIUM CHLORIDE 10 MEQ: 7.46 INJECTION, SOLUTION INTRAVENOUS at 03:16

## 2021-01-01 RX ADMIN — METOPROLOL TARTRATE 2.5 MG: 5 INJECTION INTRAVENOUS at 02:05

## 2021-01-01 RX ADMIN — VANCOMYCIN HYDROCHLORIDE 750 MG: 1 INJECTION, POWDER, LYOPHILIZED, FOR SOLUTION INTRAVENOUS at 09:24

## 2021-01-01 RX ADMIN — PIPERACILLIN SODIUM AND TAZOBACTAM SODIUM 2.25 G: 2; .25 INJECTION, POWDER, LYOPHILIZED, FOR SOLUTION INTRAVENOUS at 13:28

## 2021-01-01 RX ADMIN — PIPERACILLIN SODIUM AND TAZOBACTAM SODIUM 2.25 G: 2; .25 INJECTION, POWDER, LYOPHILIZED, FOR SOLUTION INTRAVENOUS at 14:01

## 2021-01-01 RX ADMIN — HEPARIN SODIUM 5000 UNITS: 5000 INJECTION INTRAVENOUS; SUBCUTANEOUS at 08:32

## 2021-01-01 RX ADMIN — CARBIDOPA AND LEVODOPA 1 HALF-TAB: 25; 100 TABLET ORAL at 12:20

## 2021-01-01 RX ADMIN — CARBIDOPA AND LEVODOPA 1 TABLET: 25; 100 TABLET, ORALLY DISINTEGRATING ORAL at 08:09

## 2021-01-01 RX ADMIN — MAGNESIUM SULFATE HEPTAHYDRATE 2 G: 40 INJECTION, SOLUTION INTRAVENOUS at 01:04

## 2021-01-01 RX ADMIN — DEXTROSE AND SODIUM CHLORIDE: 5; 450 INJECTION, SOLUTION INTRAVENOUS at 10:15

## 2021-01-01 RX ADMIN — SODIUM CHLORIDE: 9 INJECTION, SOLUTION INTRAVENOUS at 14:00

## 2021-01-01 RX ADMIN — FLUTICASONE PROPIONATE 2 SPRAY: 50 SPRAY, METERED NASAL at 11:26

## 2021-01-01 RX ADMIN — MAGNESIUM SULFATE HEPTAHYDRATE 2 G: 40 INJECTION, SOLUTION INTRAVENOUS at 02:16

## 2021-01-01 RX ADMIN — SODIUM CHLORIDE 250 ML: 9 INJECTION, SOLUTION INTRAVENOUS at 00:57

## 2021-01-01 RX ADMIN — PIPERACILLIN SODIUM AND TAZOBACTAM SODIUM 2.25 G: 2; .25 INJECTION, POWDER, LYOPHILIZED, FOR SOLUTION INTRAVENOUS at 04:46

## 2021-01-01 RX ADMIN — HEPARIN SODIUM 5000 UNITS: 5000 INJECTION INTRAVENOUS; SUBCUTANEOUS at 08:17

## 2021-01-01 RX ADMIN — HEPARIN SODIUM 5000 UNITS: 5000 INJECTION INTRAVENOUS; SUBCUTANEOUS at 21:00

## 2021-01-01 RX ADMIN — CARBIDOPA AND LEVODOPA 1 HALF-TAB: 25; 100 TABLET ORAL at 21:43

## 2021-01-01 RX ADMIN — CLOSTRIDIUM TETANI TOXOID ANTIGEN (FORMALDEHYDE INACTIVATED), CORYNEBACTERIUM DIPHTHERIAE TOXOID ANTIGEN (FORMALDEHYDE INACTIVATED), BORDETELLA PERTUSSIS TOXOID ANTIGEN (GLUTARALDEHYDE INACTIVATED), BORDETELLA PERTUSSIS FILAMENTOUS HEMAGGLUTININ ANTIGEN (FORMALDEHYDE INACTIVATED), BORDETELLA PERTUSSIS PERTACTIN ANTIGEN, AND BORDETELLA PERTUSSIS FIMBRIAE 2/3 ANTIGEN 0.5 ML: 5; 2; 2.5; 5; 3; 5 INJECTION, SUSPENSION INTRAMUSCULAR at 17:47

## 2021-01-01 RX ADMIN — SODIUM CHLORIDE: 9 INJECTION, SOLUTION INTRAVENOUS at 00:39

## 2021-01-01 RX ADMIN — PIPERACILLIN SODIUM AND TAZOBACTAM SODIUM 2.25 G: 2; .25 INJECTION, POWDER, LYOPHILIZED, FOR SOLUTION INTRAVENOUS at 15:57

## 2021-01-01 RX ADMIN — DEXTROSE MONOHYDRATE: 50 INJECTION, SOLUTION INTRAVENOUS at 02:34

## 2021-01-01 RX ADMIN — CARBIDOPA AND LEVODOPA 1 TABLET: 25; 100 TABLET, ORALLY DISINTEGRATING ORAL at 15:26

## 2021-01-01 RX ADMIN — DEXTROSE MONOHYDRATE: 50 INJECTION, SOLUTION INTRAVENOUS at 15:42

## 2021-01-01 RX ADMIN — POTASSIUM CHLORIDE 10 MEQ: 7.46 INJECTION, SOLUTION INTRAVENOUS at 02:07

## 2021-01-01 RX ADMIN — PIPERACILLIN SODIUM AND TAZOBACTAM SODIUM 2.25 G: 2; .25 INJECTION, POWDER, LYOPHILIZED, FOR SOLUTION INTRAVENOUS at 08:32

## 2021-01-01 RX ADMIN — CARBIDOPA AND LEVODOPA 1 TABLET: 25; 100 TABLET, ORALLY DISINTEGRATING ORAL at 17:16

## 2021-01-01 RX ADMIN — PIPERACILLIN SODIUM AND TAZOBACTAM SODIUM 2.25 G: 2; .25 INJECTION, POWDER, LYOPHILIZED, FOR SOLUTION INTRAVENOUS at 21:10

## 2021-01-01 RX ADMIN — SODIUM CHLORIDE, POTASSIUM CHLORIDE, SODIUM LACTATE AND CALCIUM CHLORIDE 1000 ML: 600; 310; 30; 20 INJECTION, SOLUTION INTRAVENOUS at 07:46

## 2021-01-01 RX ADMIN — CARBIDOPA AND LEVODOPA 1 HALF-TAB: 25; 100 TABLET ORAL at 08:09

## 2021-01-01 RX ADMIN — PIPERACILLIN SODIUM AND TAZOBACTAM SODIUM 4.5 G: 4; .5 INJECTION, POWDER, LYOPHILIZED, FOR SOLUTION INTRAVENOUS at 07:49

## 2021-01-01 RX ADMIN — AMPICILLIN SODIUM AND SULBACTAM SODIUM 3 G: 2; 1 INJECTION, POWDER, FOR SOLUTION INTRAMUSCULAR; INTRAVENOUS at 06:10

## 2021-01-01 RX ADMIN — POTASSIUM CHLORIDE 10 MEQ: 7.46 INJECTION, SOLUTION INTRAVENOUS at 04:21

## 2021-01-01 RX ADMIN — HEPARIN SODIUM 5000 UNITS: 5000 INJECTION INTRAVENOUS; SUBCUTANEOUS at 21:12

## 2021-01-01 RX ADMIN — DEXTROSE MONOHYDRATE: 50 INJECTION, SOLUTION INTRAVENOUS at 06:25

## 2021-01-01 RX ADMIN — CARBIDOPA AND LEVODOPA 1 HALF-TAB: 25; 100 TABLET ORAL at 17:16

## 2021-01-01 RX ADMIN — CARBIDOPA AND LEVODOPA 1 TABLET: 25; 100 TABLET, ORALLY DISINTEGRATING ORAL at 12:21

## 2021-01-01 RX ADMIN — HEPARIN SODIUM 5000 UNITS: 5000 INJECTION INTRAVENOUS; SUBCUTANEOUS at 20:21

## 2021-01-01 RX ADMIN — PIPERACILLIN SODIUM AND TAZOBACTAM SODIUM 2.25 G: 2; .25 INJECTION, POWDER, LYOPHILIZED, FOR SOLUTION INTRAVENOUS at 08:17

## 2021-01-01 ASSESSMENT — ENCOUNTER SYMPTOMS
HEADACHES: 0
ABDOMINAL PAIN: 0
NECK PAIN: 0
WOUND: 1
SHORTNESS OF BREATH: 1

## 2021-01-01 ASSESSMENT — ACTIVITIES OF DAILY LIVING (ADL)
ADLS_ACUITY_SCORE: 19
ADLS_ACUITY_SCORE: 21
ADLS_ACUITY_SCORE: 24
ADLS_ACUITY_SCORE: 24
ADLS_ACUITY_SCORE: 23
ADLS_ACUITY_SCORE: 17
ADLS_ACUITY_SCORE: 19
ADLS_ACUITY_SCORE: 19
ADLS_ACUITY_SCORE: 23
ADLS_ACUITY_SCORE: 19
ADLS_ACUITY_SCORE: 21
ADLS_ACUITY_SCORE: 23
ADLS_ACUITY_SCORE: 23
ADLS_ACUITY_SCORE: 17
ADLS_ACUITY_SCORE: 23
ADLS_ACUITY_SCORE: 21
ADLS_ACUITY_SCORE: 23
ADLS_ACUITY_SCORE: 21
ADLS_ACUITY_SCORE: 21

## 2021-03-23 ENCOUNTER — RECORDS - HEALTHEAST (OUTPATIENT)
Dept: LAB | Facility: CLINIC | Age: 81
End: 2021-03-23

## 2021-03-24 LAB — HGB BLD-MCNC: 8.6 G/DL (ref 14–18)

## 2021-03-25 ENCOUNTER — RECORDS - HEALTHEAST (OUTPATIENT)
Dept: LAB | Facility: CLINIC | Age: 81
End: 2021-03-25

## 2021-03-26 LAB
ANION GAP SERPL CALCULATED.3IONS-SCNC: 9 MMOL/L (ref 5–18)
BUN SERPL-MCNC: 56 MG/DL (ref 8–28)
CALCIUM SERPL-MCNC: 9.6 MG/DL (ref 8.5–10.5)
CHLORIDE BLD-SCNC: 105 MMOL/L (ref 98–107)
CO2 SERPL-SCNC: 24 MMOL/L (ref 22–31)
CREAT SERPL-MCNC: 2.55 MG/DL (ref 0.7–1.3)
GFR SERPL CREATININE-BSD FRML MDRD: 24 ML/MIN/1.73M2
GLUCOSE BLD-MCNC: 86 MG/DL (ref 70–125)
POTASSIUM BLD-SCNC: 4.4 MMOL/L (ref 3.5–5)
SODIUM SERPL-SCNC: 138 MMOL/L (ref 136–145)

## 2021-04-26 ENCOUNTER — RECORDS - HEALTHEAST (OUTPATIENT)
Dept: LAB | Facility: CLINIC | Age: 81
End: 2021-04-26

## 2021-04-28 LAB
ALBUMIN SERPL-MCNC: 2.2 G/DL (ref 3.5–5)
ANION GAP SERPL CALCULATED.3IONS-SCNC: 9 MMOL/L (ref 5–18)
BUN SERPL-MCNC: 41 MG/DL (ref 8–28)
CALCIUM SERPL-MCNC: 9.8 MG/DL (ref 8.5–10.5)
CHLORIDE BLD-SCNC: 110 MMOL/L (ref 98–107)
CO2 SERPL-SCNC: 23 MMOL/L (ref 22–31)
CREAT SERPL-MCNC: 3.23 MG/DL (ref 0.7–1.3)
GFR SERPL CREATININE-BSD FRML MDRD: 18 ML/MIN/1.73M2
GLUCOSE BLD-MCNC: 90 MG/DL (ref 70–125)
HGB BLD-MCNC: 8.4 G/DL (ref 14–18)
PHOSPHATE SERPL-MCNC: 4.6 MG/DL (ref 2.5–4.5)
POTASSIUM BLD-SCNC: 4 MMOL/L (ref 3.5–5)
PTH-INTACT SERPL-MCNC: 57 PG/ML (ref 10–86)
SODIUM SERPL-SCNC: 142 MMOL/L (ref 136–145)

## 2021-04-29 LAB — 25(OH)D3 SERPL-MCNC: 28.6 NG/ML (ref 30–80)

## 2021-05-11 ENCOUNTER — RECORDS - HEALTHEAST (OUTPATIENT)
Dept: LAB | Facility: CLINIC | Age: 81
End: 2021-05-11

## 2021-05-12 LAB — BACTERIA SPEC CULT: NO GROWTH

## 2021-05-21 NOTE — ED NOTES
Bed: ED29  Expected date: 5/21/21  Expected time: 2:22 PM  Means of arrival: Ambulance  Comments:  [place] 80m Fall lac  ETA 1422

## 2021-05-21 NOTE — ED TRIAGE NOTES
Pt in his WC, Bent over to get a pillow out of the way that was on the floor. Fell our of WC hitting forehead on edge of a mattress.  Witnessed by wife who called for help. Denies LOC. Does not take blood thinners

## 2021-05-21 NOTE — DISCHARGE INSTRUCTIONS
Twice daily cleaning of the scalp wounds and apply topical antibiotic cream.     Discharge Instructions  Head Injury    You have been seen today for a head injury. Your evaluation included a history and physical examination. You may have had a CT (CAT) scan performed, though most head injuries do not require a scan. Based on this evaluation, your provider today does not feel that your head injury is serious.    Generally, every Emergency Department visit should have a follow-up clinic visit with either a primary or a specialty clinic/provider. Please follow-up as instructed by your emergency provider today.  Return to the Emergency Department if:  You are confused or you are not acting right.  Your headache gets worse or you start to have a really bad headache even with your recommended treatment plan.  You vomit (throw up) more than once.  You have a seizure.  You have trouble walking.  You have weakness or paralysis (cannot move) in an arm or a leg.  You have blood or fluid coming from your ears or nose.  You have new symptoms or anything that worries you.    Sleeping:  It is okay for you to sleep, but someone should wake you up if instructed by your provider, and someone should check on you at your usual time to wake up.     Activity:  Do not drive for at least 24 hours.  Do not drive if you have dizzy spells or trouble concentrating, or remembering things.  Do not return to any contact sports until cleared by your regular provider.     MORE INFORMATION:    Concussion:  A concussion is a minor head injury that may cause temporary problems with the way the brain works. Although concussions are important, they are generally not an emergency or a reason that a person needs to be hospitalized. Some concussion symptoms include confusion, amnesia (forgetful), nausea (sick to your stomach) and vomiting (throwing up), dizziness, fatigue, memory or concentration problems, irritability and sleep problems. For most people,  concussions are mild and temporary but some will have more severe and persistent symptoms that require on-going care and treatment.  CT Scans: Your evaluation today may have included a CT scan (CAT scan) to look for things like bleeding or a skull fracture (broken bone).  CT scans involve radiation and too many CT scans can cause serious health problems like cancer, especially in children.  Because of this, your provider may not have ordered a CT scan today if they think you are at low risk for a serious or life threatening problem.    If you were given a prescription for medicine here today, be sure to read all of the information (including the package insert) that comes with your prescription.  This will include important information about the medicine, its side effects, and any warnings that you need to know about.  The pharmacist who fills the prescription can provide more information and answer questions you may have about the medicine.  If you have questions or concerns that the pharmacist cannot address, please call or return to the Emergency Department.     Remember that you can always come back to the Emergency Department if you are not able to see your regular provider in the amount of time listed above, if you get any new symptoms, or if there is anything that worries you.

## 2021-05-21 NOTE — ED PROVIDER NOTES
History   Chief Complaint:  Fall       HPI   Latisha Garay is a 80 year old male with history of Parkinson's disease on [place] who presents from assisted living via EMS after a mechanical fall with abrasions The patient reports he was in his wheelchair when he bent over to get a pillow out of his way on the floor when he fell out of his wheelchair hitting his forehead on the edge of a mattress. This fall was witnessed by his wife. He denies loss of consciousness, vision changes, neck or back pain, headache, and hip pain. His last Td/Tdap was 2012.    Review of Systems   Eyes: Negative for visual disturbance.   Musculoskeletal: Negative for neck pain.   Skin: Positive for wound.   Neurological: Negative for syncope and headaches.   All other systems reviewed and are negative.    Allergies:  The patient has no known allergies.     Medications:  Flonase  [place]  Senna  Prilosec  Ditropan    Past Medical History:    Anemia  Chronic kidney disease stage 4  Sebaceous carcinoma  Dysphagia  Umbilical hernia  Dupuytren's disease  Parkinson's disease  Benign prostatic hyperplasia   Hypertension    Past Surgical History:    Cyst removal (L wrist)  Tonsil and adenoidectomy  Open repair thumb fracture dislocation (R)  Lipoma removal  Repair of cut  Cataract removal    Family History:    Hyperlipidemia (Brother  Heart disease (Father)  Alzheimer's (Mother)    Social History:  Marital status:   Recently moved to assisted living  Lives with his wife  Uses wheelchair    Physical Exam     Patient Vitals for the past 24 hrs:   BP Temp Temp src Pulse Resp SpO2   05/21/21 1445 (!) 142/72 97.7  F (36.5  C) Temporal 56 14 99 %       Physical Exam  Nursing notes reviewed. Vitals reviewed.  General: Alert. Well kept.  Eyes:  Conjunctiva non-injected, non-icteric.  Ears: no mastoid tenderness.  No hemotympanum. Normal TM.  Neck/Throat: Moist mucous membranes.  Normal voice.  Cardiac: Regular rhythm. Normal heart sounds with no  "murmur/rubs/click.   Pulmonary: Clear and equal breath sounds bilaterally. No crackles/rales. No wheezing  Abdomen: Soft. Non-distended. Non-tender to palpation. No masses. No guarding or rebound.  Musculoskeletal: Normal gross range of motion of all 4 extremities.  No midline cervical, thoracic, lumbar spinal tenderness.  Neurological: Alert and oriented x4.   Skin: Warm and dry without rashes or petechiae. 3cm abrasion to the anterior scalp.  Psych: Affect normal. Good eye contact.      Emergency Department Course     Imaging:  Head CT w/o Contrast  1. Mild to moderate cerebral atrophy.   2. Moderate right mastoid and middle ear cavity effusion. There is no   fracture lines identified and the ossicular chain appears to be within   normal limits.   3. No evidence for any intracranial hemorrhage or any acute brain   pathology.   As read by Radiology.    Emergency Department Course:    Reviewed:  I reviewed nursing notes, vitals and past medical history    Assessments:  1451 I obtained history and examined the patient as noted above.   1711 I rechecked the patient and explained findings.   1732 I rechecked the patient again.     Interventions:  1747 Adacel 0.5 mL IM     Disposition:  The patient was discharged to home.       Impression & Plan     Medical Decision Making:  This patient presents with a history of head injury.  The differential diagnosis includes skull fracture, epidural hematoma, subdural hematoma, intracerebral hemorrhage, and traumatic subarachnoid hemorrhage.  There are no physical signs of skull fracture or other bony fracture on exam and the patient is well-appearing, but using [place] head CT guidelines, a CT of the head was indicated.  Fortunately, no signs of intracerebral bleed or skull fracture were detected during this visit on CT imaging.  Despite the normal neuroimaging,  the patient understands that he must return if any \"red flags\" appear/develop in the coming hours/days, as this may " "represent an indication to perform another CT scan.  I have noted that \"red flags\" include: lethargy or irritability,  strange behavior, seizures, repeated vomiting, weakness or loss of responsiveness. Although rare, delayed CNS bleeds can occur, and this was discussed with the patient.  CT did show a moderate right mastoid and middle ear effusion.  He has no hearing changes and no hemotympanum.  He denies recent fever or ear pain.  This is likely chronic and exam is not consistent with basilar skull fracture.  His neck is cleared clinically using Nexus criteria.  No other injury noted on detailed trauma examination.  Patient will be returned to his [facility] and they were contacted and accepted the patient in return.  He will transfer by EMS stretcher.  Closed head injury instructions were given.     Diagnosis:    ICD-10-CM    1. Abrasion of scalp, initial encounter  S00.01XA    2. Closed head injury, initial encounter  S09.90XA    3. Mastoid disorder, right  H74.91     Moderate right mastoid and middle ear cavity effusion.       Scribe Disclosure:  I, Milan Smith, am serving as a scribe at 2:51 PM on 5/21/2021 to document services personally performed by Dori Maravilla DNP based on my observations and the provider's statements to me.            Wallace, Dori, CNP  05/22/21 1952    "

## 2021-05-22 PROBLEM — R13.10 DYSPHAGIA: Status: ACTIVE | Noted: 2021-01-01

## 2021-05-22 PROBLEM — C44.99 SEBACEOUS CARCINOMA: Status: ACTIVE | Noted: 2021-01-01

## 2021-05-22 PROBLEM — R54 FRAILTY: Status: ACTIVE | Noted: 2020-01-01

## 2021-05-22 PROBLEM — D63.8 ANEMIA, CHRONIC DISEASE: Status: ACTIVE | Noted: 2021-01-01

## 2021-05-27 ENCOUNTER — RECORDS - HEALTHEAST (OUTPATIENT)
Dept: LAB | Facility: CLINIC | Age: 81
End: 2021-05-27

## 2021-05-28 LAB
BASOPHILS # BLD AUTO: 0 THOU/UL (ref 0–0.2)
BASOPHILS NFR BLD AUTO: 1 % (ref 0–2)
EOSINOPHIL # BLD AUTO: 0.1 THOU/UL (ref 0–0.4)
EOSINOPHIL NFR BLD AUTO: 2 % (ref 0–6)
ERYTHROCYTE [DISTWIDTH] IN BLOOD BY AUTOMATED COUNT: 14.1 % (ref 11–14.5)
HCT VFR BLD AUTO: 26 % (ref 40–54)
HGB BLD-MCNC: 7.8 G/DL (ref 14–18)
IMM GRANULOCYTES # BLD: 0 THOU/UL
IMM GRANULOCYTES NFR BLD: 0 %
IRON SATN MFR SERPL: 32 % (ref 20–50)
IRON SERPL-MCNC: 41 UG/DL (ref 42–175)
IRON SERPL-MCNC: 41 UG/DL (ref 42–175)
LYMPHOCYTES # BLD AUTO: 0.8 THOU/UL (ref 0.8–4.4)
LYMPHOCYTES NFR BLD AUTO: 16 % (ref 20–40)
MCH RBC QN AUTO: 30.5 PG (ref 27–34)
MCHC RBC AUTO-ENTMCNC: 30 G/DL (ref 32–36)
MCV RBC AUTO: 102 FL (ref 80–100)
MONOCYTES # BLD AUTO: 0.4 THOU/UL (ref 0–0.9)
MONOCYTES NFR BLD AUTO: 8 % (ref 2–10)
NEUTROPHILS # BLD AUTO: 3.6 THOU/UL (ref 2–7.7)
NEUTROPHILS NFR BLD AUTO: 73 % (ref 50–70)
PLATELET # BLD AUTO: 208 THOU/UL (ref 140–440)
PMV BLD AUTO: 10 FL (ref 8.5–12.5)
RBC # BLD AUTO: 2.56 MILL/UL (ref 4.4–6.2)
TIBC SERPL-MCNC: 129 UG/DL (ref 313–563)
TRANSFERRIN SERPL-MCNC: 103 MG/DL (ref 212–360)
WBC: 4.9 THOU/UL (ref 4–11)

## 2021-08-30 ENCOUNTER — MEDICAL CORRESPONDENCE (OUTPATIENT)
Dept: HEALTH INFORMATION MANAGEMENT | Facility: CLINIC | Age: 81
End: 2021-08-30

## 2021-08-31 PROBLEM — A41.9 SEPSIS, DUE TO UNSPECIFIED ORGANISM, UNSPECIFIED WHETHER ACUTE ORGAN DYSFUNCTION PRESENT (H): Status: ACTIVE | Noted: 2021-01-01

## 2021-08-31 PROBLEM — E87.29 RESPIRATORY ACIDOSIS: Status: ACTIVE | Noted: 2021-01-01

## 2021-08-31 PROBLEM — J18.9 PNEUMONIA DUE TO INFECTIOUS ORGANISM, UNSPECIFIED LATERALITY, UNSPECIFIED PART OF LUNG: Status: ACTIVE | Noted: 2021-01-01

## 2021-08-31 PROBLEM — J96.01 ACUTE RESPIRATORY FAILURE WITH HYPOXIA (H): Status: ACTIVE | Noted: 2021-01-01

## 2021-08-31 PROBLEM — I95.1 ORTHOSTATIC HYPOTENSION: Status: ACTIVE | Noted: 2021-01-01

## 2021-08-31 PROBLEM — N17.9 ACUTE KIDNEY INJURY (H): Status: ACTIVE | Noted: 2021-01-01

## 2021-08-31 NOTE — PHARMACY-ADMISSION MEDICATION HISTORY
Pharmacy Medication History  Admission medication history interview status for the 8/31/2021  admission is complete. See EPIC admission navigator for prior to admission medications     Location of Interview: Phone  Medication history sources: Patient's home med list-Nursing home: [facility]    Significant changes made to the medication list:  Removed Calcitriol capsules    In the past week, patient estimated taking medication this percent of the time: greater than 90%    Additional medication history information:   N/A    Medication reconciliation completed by provider prior to medication history? No    Time spent in this activity: 15 minutes     Prior to Admission medications    Medication Sig Last Dose Taking? Auth Provider   acetaminophen (TYLENOL) 500 MG tablet Take 500 mg by mouth every 8 hours as needed for mild pain  Yes Unknown, Entered By History   carbidopa-levodopa (PARCOPA)  MG ODT Take 1.5 tablets by mouth 4 times daily  8/30/2021 at Unknown time Yes Reported, Patient   cholecalciferol 50 MCG (2000 UT) tablet Take 2,000 Units by mouth 8/30/2021 at Unknown time Yes Reported, Patient   ferrous sulfate (FEROSUL) 325 (65 Fe) MG tablet Take 325 mg by mouth 8/30/2021 at Unknown time Yes Reported, Patient   fluticasone (FLONASE) 50 MCG/ACT nasal spray Spray 2 sprays into both nostrils daily  8/30/2021 at Unknown time Yes Reported, Patient   melatonin 3 MG tablet Take 3 mg by mouth nightly as needed for sleep  Yes Unknown, Entered By History   melatonin 3 MG tablet Take 3 mg by mouth At Bedtime  8/30/2021 at Unknown time Yes Reported, Patient   omeprazole (PRILOSEC) 40 MG DR capsule Take 40 mg by mouth 8/30/2021 at Unknown time Yes Reported, Patient   oxybutynin (DITROPAN) 5 MG tablet Take 5 mg by mouth 8/30/2021 at Unknown time Yes Reported, Patient   senna (SENOKOT) 8.6 MG tablet Take 8.6 mg by mouth daily as needed   Yes Reported, Patient   vitamin B-12 (CYANOCOBALAMIN) 1000 MCG tablet  8/30/2021  at Unknown time Yes Reported, Patient       The information provided in this note is only as accurate as the sources available at the time of update(s)

## 2021-08-31 NOTE — ED NOTES
Bed: ST02  Expected date:   Expected time:   Means of arrival:   Comments:  [place] 81m SOB edema 87% hypotensive eta 3

## 2021-08-31 NOTE — H&P
[facility]    History and Physical - Hospitalist Service       Date of Admission:  8/31/2021    Assessment & Plan      Latisha Garay is an 81 year old male with history of stage IV chronic kidney disease with baseline creatinine 3.0, chronic anemia, secondary hyperparathyroidism, hypertension, sebaceous carcinoma, dysphagia, BPH s/p TURP and advanced Parkinson's disease with recent decline in functional status over past several months now wheelchair bound with 24 hour extended RN care at his group home who presents with shortness of breath.    Healthcare associated pneumonia with possible aspiration.  Acute hypoxic and hypercarbic respiratory failure.  Chest x-ray shows right-sided pneumonia.  SARS-CoV-2 was negative.  Patient was started on BiPAP with oxygen's in the 60% range prior to admission.  Initial VBG showed PCO2 of 64, PCO2 of 52 and pH of 7.17.  Repeat VBG after 1 hour on BiPAP showed improvement PCO2 of 49, PO2 of 25, with pH of 7.29.  -Continue BiPAP and wean as tolerated  -IVF with 0.9% NS at 100 mL/hr  -Continue Zosyn and vancomycin that was started in the emergency department, if blood cultures negative after 48 hours would discontinue vancomycin  -CBC in a.m.  -N.p.o. for now pending being off BiPAP and speech evaluation, was on dysphagia diet for many years and earlier this year signed a waiver to start doing clear liquids at his facility, will need to transition to renal diet once cleared for speech therapy  -Discussed CODE STATUS with his wife and for now she would like him to continue to be full code since this is his first episode of severe pneumonia.  We discussed this in detail and she is going to continue to think about the risk of suffering versus the benefit of extending life.    Elevated troponin likely secondary to NSTEMI from demand ischemia related to his sepsis.  Had mild troponin elevation during last hospitalization in [facility] at 0.44 and was treated for  hypotension thought to be from dehydration.  Troponin on this admission mildly elevate at 0.081. EKG on admission showed normal sinus rhythm without signs of active ischemia.  Prior echocardiogram at [facility] on 3/19/2021 showed normal ejection fraction of 58% with a moderately enlarged left atrium and grade 2 left ventricular diastolic dysfunction.  -Discussed options with his wife and for now we will focus on treating his pneumonia and respiratory failure  -Repeat troponin in a.m. and would not investigate further and less large increase noted    Advanced Parkinson's Disease  Frequent Falls  PTA takes 1.5 carbidopa-levodopa  mg tabs four times daily.  He is wheelchair-bound and worsening.  Is mostly nonverbal.  Lives at assisted living with his wife and has 24-hour RN services.  -Hold sinemet while on BIPAP and NPO  -once transitioned off BIPAP will likely need to start Parcopa disintegrating tablets at home dose while n.p.o., plan to transition back to Sinemet once tolerating diet    Dysphagia  Chronic problem for many years. Saw ENT 4/2017 because 2 video swallows demonstrated silent aspiration with laryngeal penetration. ENT examination demonstrated functional vocal folds and no indication of laryngeal dysfunction. Unclear etiology, felt possibly related to Parkinson's. Has been following with speech therapy as outpatient and on dysphagia diet with nectar thick liquids, though per daughter and wife, pt signed documents at nursing home stating he would like to resume thin liquids so this has been allowed in that setting.  -N.p.o. for now while on BiPAP with concerns for aspiration pneumonia  -Consult SLP to look further into appropriate diet and make recommendations, this will be a difficult situation with advancing Parkinson's disease and patient refusing thickened liquids in the past.  His wife said she thinks she can convince him to do the dysphagia diet and would want to proceed with that.  She brought  up the option of a feeding tube and I said that we usually do not do that for advanced Parkinson's disease as there is usually no clear medical benefit.    Stage IV CKD   Creainine 2.88 on admission. Baseline Cr ~2.6 to 3.0. Follows with Dr. Mike of [facility] as outpatient. Not felt to be a good dialysis candidate, should renal function further decline.  -BMP in AM    Chronic Anemia  Baseline over the past several months has been 7.8-9.2 from 11 range in late 2020. Has been following with oncology and nephrology in outpt setting and anemia felt to be related to CKD and being considered for initiation of EPO in the outpatient setting (oncology felt pt may also have possible mild additional component of iron deficiency or anemia of chronic disease and started empiric oral iron therapy, so far without much improvement). EGD 4/27 with some hematin in stomach, candidal esophagitis, and Izaguirre's but no overt bleeding source, colonoscopy not yet able to be completed (poor prep on 4/27).  -CBC in AM  -ongoing outpt follow-up with hematology s/p discharge  -continue proton-pump inhibitor therapy for Izaguirre's  -continue PTA oral iron therapy    Left 1st MTP Ulcer  Assessment: Examination reveals developing pressure wound on the medial aspect of the Left 1st MTP.  -WOC consult    Sebaceous carcinoma  Removed from left ear in 2/2021. Follows with the [facility].  -Follow-up with oncology as arranged    Secondary hyperparathyroidism   PTA not on medication. Ca 8.5 on admission  -follow up as outpatient for continued monitoring as needed    Urinary Incontinence  BPH (benign prostatic hyperplasia)  On oxybutynin for urinary incontinence, not on therapy for BPH. History of TURP.  - hold oxybutynin for now while NPO       Diet:   NPO no exceptions  DVT Prophylaxis: Heparin SQ  Danielle Catheter: Not present  Central Lines: None  Code Status:   Full code    Clinically Significant Risk Factors Present on  Admission             # Coagulation Defect: INR = 1.33 (Ref range: 0.85 - 1.15) and/or PTT = 36 Seconds (Ref range: 22 - 38 Seconds) on admission, will monitor for bleeding       Disposition Plan   Expected discharge: 09/04/2021 recommended to prior living arrangement once O2 use less than 1 liters/minute, safe disposition plan/ TCU bed available, SIRS/Sepsis treated and tolerating diet to maintain hydration.     The patient's care was discussed with the Patient's Family and ED provider.    Slim Dominguez MD  [facility]  Securely message with the Vocera Web Console (learn more here)  Text page via [facility] Paging/Directory      ______________________________________________________________________    Chief Complaint   Shortness of breath and hypoxia    History is obtained from the electronic health record, emergency department physician and patient's spouse    History of Present Illness    Latisha Garay is an 81 year old male with history of stage IV chronic kidney disease with baseline creatinine 3.0, chronic anemia, secondary hyperparathyroidism, hypertension, sebaceous carcinoma, dysphagia, BPH s/p TURP and advanced Parkinson's disease with recent decline in functional status over past several months now wheelchair bound with 24 hour extended RN care at his [facility] who presents with shortness of breath. Patient is mostly nonverbal at baseline and was not able to provide much history. Per chart review, the patient was recently admitted on 8/12/21 at an [facility] facility for unspecified hypotension possibly due to dehydration and a mildly elevated troponin. Per EMS report, the patient woke at his care facility about 1.5 hours prior to presentation and was placed into a chair and about a half hour later was found with a respiratory rate of 30 and oxygen saturations in the 60's. EMS attempted to reposition him, found his O2 to be 64% along with a gurgling cough. He was placed on 10 L oxygen via  non-rebreather mask and his O2 sats are up to 90%. He had persistent hypotension but normal blood sugar. He has no history of pneumonia, CHF or COPD. He denies abdominal pain or chest pain. He is Covid-19 vaccinated.      The patient's wife does not feel that he aspirated. He had some shortness of breath overnight. His wife denies him having fever or cough. She denies recent falls. Denies vomiting or diarrhea now. He possibly had some diarrhea a few days ago.   Evaluation in the emergency department showed a low blood pressure of 78/47 with persistent tachycardia and hypoxia.  Patient was placed on BiPAP with improvement of his respiratory status.  He was given some hydration with improvement of his blood pressure.  Chest x-ray shows right-sided pneumonia. SARS-CoV-2 was negative. VBG showed pH of 7.17 with PCO2 of 64.  Creatinine was 2.88 which is at his baseline.  BNP and troponin I were mildly elevated at 13,070 and 0.081 respectively with recent troponin at [facility] mildly elevated at 0.44.  Procalcitonin was 0.43 which is in the possible early systemic infection range.  White blood cell count was normal at 5.8 and hemoglobin was 8.6 which is stable from it being 8.2 on 8/13/2021.  Urinalysis showed 12 white blood cells and 3 red blood cells but was contaminated with 3 squamous epithelial cells.      Review of Systems    Review of systems not obtained due to patient factors - mental status    Past Medical History    I have reviewed this patient's medical history and updated it with pertinent information if needed.   No past medical history on file.    Past Surgical History   I have reviewed this patient's surgical history and updated it with pertinent information if needed.  No past surgical history on file.    Social History   I have reviewed this patient's social history and updated it with pertinent information if needed.  Social History     Tobacco Use     Smoking status: Not on file   Substance Use Topics      Alcohol use: Not on file     Drug use: Not on file       Family History     Unable to obtain due to: Nonverbal from Parkinson's disease and current infection.    Prior to Admission Medications   Prior to Admission Medications   Prescriptions Last Dose Informant Patient Reported? Taking?   calcitRIOL (ROCALTROL) 0.25 MCG capsule   Yes No   Sig: TAKE TWO CAPSULES BY MOUTH DAILY   carbidopa-levodopa (PARCOPA)  MG ODT   Yes No   cholecalciferol 50 MCG (2000 UT) tablet   Yes No   Sig: Take 2,000 Units by mouth   ferrous sulfate (FEROSUL) 325 (65 Fe) MG tablet   Yes No   Sig: Take 325 mg by mouth   fluticasone (FLONASE) 50 MCG/ACT nasal spray   Yes No   melatonin 3 MG tablet   Yes No   Si  to 6  mg hs  prn sleep   multivitamin (CENTRUM) chewable tablet   Yes No   omeprazole (PRILOSEC) 40 MG DR capsule   Yes No   Sig: Take 40 mg by mouth   oxybutynin (DITROPAN) 5 MG tablet   Yes No   Sig: Take 5 mg by mouth   senna (SENOKOT) 8.6 MG tablet   Yes No   Sig: Take 8.6 mg by mouth   vitamin B-12 (CYANOCOBALAMIN) 1000 MCG tablet   Yes No      Facility-Administered Medications: None     Allergies   No Known Allergies    Physical Exam   Vital Signs: Temp: 97.3  F (36.3  C) Temp src: Rectal BP: 118/85 Pulse: 67   Resp: 13 SpO2: 95 % O2 Device: BiPAP/CPAP Oxygen Delivery: 15 LPM  Weight: 122 lbs 5.68 oz  Constitutional: Opens eyes, does not follow commands, moderate respiratory distress on BiPAP but is tolerating the BiPAP currently.  Eyes: Conjunctiva and pupils examined and normal.  HEENT: Dry mucous membranes, normal dentition.  Respiratory: Crackles at the right lung base, no wheezing.  Cardiovascular: Regular rate and rhythm, normal S1 and S2, and no murmur noted.  GI: Soft, non-distended, non-tender, normal bowel sounds.  Lymph/Hematologic: No anterior cervical or supraclavicular adenopathy.  Skin: No rashes, no cyanosis, no edema, 1 cm ulcer on the lateral aspect of his fifth MTP joint with no overt sign of  infection.  Musculoskeletal: No joint swelling, erythema or tenderness.  Neurologic: Moves all extremities spontaneously but is not following commands.  No facial droop noted.  Psychiatric: Opens eyes but not following commands.  Nonverbal.    Data   Data reviewed today: I reviewed all medications, new labs and imaging results over the last 24 hours. I personally reviewed the EKG tracing showing normal sinus rhythm without ST changes to suggest active ischemia.    Recent Labs   Lab 08/31/21  0727   WBC 5.8   HGB 8.6*         INR 1.33*      POTASSIUM 3.5   CHLORIDE 112*   CO2 25   BUN 44*   CR 2.88*   ANIONGAP 4   HANNA 8.5   GLC 99   TROPONIN 0.081*     Recent Results (from the past 24 hour(s))   XR Chest Port 1 View    Narrative    CHEST ONE VIEW PORTABLE   8/31/2021 7:40 AM     HISTORY: Shortness of breath.    COMPARISON: None available.      Impression    IMPRESSION: Portable AP view of the chest was obtained. Mildly large  cardiac silhouette. Right lung predominant patchy airspace pulmonary  opacities, worrisome for infection. High lung volume, likely due to  underlying COPD changes. No significant pleural effusion or  pneumothorax.    LISA JOHNSON MD         SYSTEM ID:  [id number]

## 2021-08-31 NOTE — PLAN OF CARE
Pt came from ED around 1245. A&O to self, but calm and cooperative. VSS on BiPAP. Strict NPO, mouth swap X1 provided. Turn and reposition every 2hrs. L foot ulcer, mepilex in placed, WOC consulted. External cath placed, incontinence of urine X1. Continue to monitor.

## 2021-08-31 NOTE — PLAN OF CARE
SLP: Orders received and chart reviewed. Patient currently on Bi-pap and not appropriate. Will reschedule evaluation for 9/1/21 as able. Patient with a history of silent aspiration of thin liquids on video swallow study at outside facility.

## 2021-08-31 NOTE — ED NOTES
Pt presents to the ED via EMS for evaluation of SOB. Pt comes from [facility] where staff found pt with rapid respiratory rate and O2 saturations in the 60%s. When EMS arrived pt was 64% on RA. EMS reports no history of CHF or COPD. EMS placed pt on 10L via Oxymask with saturations in the 90%s. Pt hypotensive 80s systolic for EMS. Blood sugar: 124.

## 2021-08-31 NOTE — PHARMACY-VANCOMYCIN DOSING SERVICE
"Pharmacy Vancomycin Initial Note  Date of Service 2021  Patient's  1940  81 year old, male    Indication: Aspiration Pneumonia, HAP    Current estimated CrCl = Estimated Creatinine Clearance: 15.3 mL/min (A) (based on SCr of 2.88 mg/dL (H)).    Creatinine for last 3 days  2021:  7:27 AM Creatinine 2.88 mg/dL    Recent Vancomycin Level(s) for last 3 days  No results found for requested labs within last 72 hours.      Vancomycin IV Administrations (past 72 hours)                   vancomycin (VANCOCIN) 1000 mg in dextrose 5% 200 mL PREMIX (mg) 1,000 mg New Bag 21 0956                Nephrotoxins and other renal medications (From now, onward)    Start     Dose/Rate Route Frequency Ordered Stop    21 1531  vancomycin place workman - receiving intermittent dosing      1 each Intravenous SEE ADMIN INSTRUCTIONS 21 1532      21 1400  piperacillin-tazobactam (ZOSYN) 2.25 g vial to attach to  ml bag     Note to Pharmacy: For [facility], [facility] and [facility]: For Zosyn-naive patients, use the \"Zosyn initial dose + extended infusion\" order panel.    2.25 g  over 30 Minutes Intravenous EVERY 6 HOURS 21 1311                  Plan:  1. Start vancomycin with intermittent dosing (pt received 1000 mg ~ 1000 ).  2. Vancomycin monitoring method: Trough (Method 1 = dosing nomogram)  3. Vancomycin therapeutic monitoring goal: 15-20 mg/L  4. Pharmacy will check vancomycin levels as appropriate in 1-3 Days.    5. Serum creatinine levels will be ordered daily for the first week of therapy and at least twice weekly for subsequent weeks.      Silvina Weiss, PharmD    "

## 2021-08-31 NOTE — ED PROVIDER NOTES
History   Chief Complaint:  Shortness of Breath     The history is provided by the EMS personnel, the nursing home and the spouse. The history is limited by the condition of the patient.      Latisha Garay is a 81 year old male with history of chronic kidney disease, hypertension, sebaceous carcinoma and Parkinson's disease with recent decline in functional status over past several months now wheelchair bound who presents with shortness of breath. Per chart review, the patient was recently admitted on 8/12/21 for unspecified hypotension possibly due to dehydration and a mildly elevated troponin. Per EMS report, the patient woke at his care facility about 1.5 hours ago and was placed into a chair and about a half hour later was found with a respiratory rate of 30 and oxygen saturations in the 60s. EMS attempted to reposition him, found his O2 to be 64% along with a gurgling cough. He was placed on 10 L oxygen via non-rebreather mask and his O2 sats are up to 90%. He has consistently had hypotension but normal blood sugar. He has no history of pneumonia, CHF or COPD. Patient speaks in only yes/no answers. He denies abdominal pain or chest pain. He denies falls. He is Covid-19 vaccinated.     The patient's wife does not feel that he aspirated. He had some shortness of breath overnight. His wife denies fever or cough. She denies recent falls. Denies vomiting or diarrhea now. He possibly had some diarrhea a few days ago.     Review of Systems   Respiratory: Positive for shortness of breath.    Cardiovascular: Negative for chest pain.   Gastrointestinal: Negative for abdominal pain.   All other systems reviewed and are negative.        Allergies:  No Known Drug Allergies     Medications:  Ferosul  Prilosec  Multivitamin  Flonase  Parcopa  Rocaltrol  Ditropan    Past Medical History:    Anemia  Sebaceous carcinoma  Dupuytren's disease  Parkinson's disease  Hernia  Hyperparathyroidism  Chronic kidney disease, stage  4  Hypertension  Hypotension  Pure hypercholesterolemia    Past Surgical History:    Tonsillectomy and adenoidectomy   Thumb fracture repair    Family History:    Brother- hyperlipidemia   Father- heart disease  Mother- alzheimer's disease    Social History:  Presents alone  Lives in care facility      Physical Exam     Patient Vitals for the past 24 hrs:   BP Temp Temp src Pulse Resp SpO2 Weight   08/31/21 0850 123/71 -- -- 67 14 98 % --   08/31/21 0830 123/75 -- -- 77 10 97 % --   08/31/21 0815 116/63 -- -- 77 18 99 % --   08/31/21 0800 108/68 -- -- 81 20 98 % 55.5 kg (122 lb 5.7 oz)   08/31/21 0735 -- 97.3  F (36.3  C) Rectal -- -- -- --   08/31/21 0734 -- -- -- 79 20 97 % --   08/31/21 0729 -- -- -- 80 28 98 % --   08/31/21 0715 (!) 78/47 -- -- 81 (!) 36 93 % --   08/31/21 0714 (!) 78/47 -- -- 81 (!) 36 (!) 87 % --       Physical Exam    Physical Exam   Constitutional:  Very frail elderly gentleman in acute respiratory distress.    HENT:   Mouth/Throat:   Oropharynx is clear and dry.   Eyes:    Conjunctivae normal and EOM are normal. Pupils are equal, round, and reactive to light.   Neck:    Normal range of motion.   Cardiovascular: Normal rate, regular rhythm and normal heart sounds.  Exam reveals no gallop and no friction rub.  No murmur heard.  Pulmonary/Chest:  Diminished breath sounds on the right as compared to the left. Patient has no wheezes. Patient has no rales. Tachypneic.   Abdominal:   Patient is thin. Soft. Bowel sounds are normal. Patient exhibits no mass. There is no obvious tenderness. There is no rebound and no guarding.   Musculoskeletal:  Normal range of motion. Patient exhibits no edema.   Neurological:   Patient is lethargic. Answers some yes and no questions weakly. Patient holds all of his extremities in flexed positions. History of Parkinson's disease.   Skin:   Skin is warm and dry. No rash noted. No erythema.   Psychiatric:   Unable to assess.     Emergency Department Course    ECG  ECG taken at 0730, ECG read at 0730  Shortness of breath    No prior EKG.   Rate 81 bpm. VA interval 84 ms. QRS duration 96 ms. QT/QTc 384/446 ms. P-R-T axes 45 18 30. Sinus rhythm with short VA. Septal infarct, age undetermined.       Imaging:  XR Chest Port 1 View  Portable AP view of the chest was obtained. Mildly large   cardiac silhouette. Right lung predominant patchy airspace pulmonary   opacities, worrisome for infection. High lung volume, likely due to   underlying COPD changes. No significant pleural effusion or   pneumothorax.  As read by Radiology.     Laboratory:  CBC: WBC 5.8, HGB 8.6 (L),   BMP: glucose chloride 112 (H), urea nitrogen 44 (H), creatinine 2.88 (H), GFR 19 (L) o/w WNL    Blood Culture x2: Pending     INR: 1.33 (H)    PTT: 36    Lactic acid (result time 0737) 1.2      Troponin (Collected 0727): 0.081 (H)     N terminal Pro BNP: 13,070 (H)  BNP: 12,753    iStat gases (lactate) venous: O2 75 (L), pCO2 64 (H), pH 7.17 (L), pO2 52 (H)  iStat gases (lactate) venous: O2 37 (L), pH 7.29 (L)    Asymptomatic COVID-19 Virus (Coronavirus) by PCR: negative    UA with microscopic: blood trace, protein albumin 20, bacteria few, mucus present, RBC urine 8 (H), WBC urine 12 (H), squamous epithelials 3 (H) o/w WNL    Urine culture: Pending    Procalcitonin: 0.43    Emergency Department Course:    Reviewed:  I reviewed nursing notes, vitals, past medical history and care everywhere    Assessments:  0711 Patient arrived to [facility]. I obtained history and examined the patient as noted above. Placed patient on BiPAP. Placed on cardiac monitor, pulse oximeter and peripheral IV established. ISTAT labs obtained.  0728 I spoke with the patient's emergency contact Lorna Jackson.   0730 I spoke with the patient's wife.   0737 I checked the portable chest x-ray at bedside, it looks like right greater than left-sided bilateral pneumonia.  0830 I rechecked the patient and explained findings.   0903 I  "rechecked the patient. He is stable.     Consults:   0848 I spoke with Dr. Dominguez of the hospitalist service.     Interventions:  0746: Lactated ringers 1000 mL IV Bolus    0749: Zosyn 4.5 g in 100 mL NS IV Infusion   0955: Vancocin 1000 mg in NaCl 0.9% 250 mL IV Infusion     Disposition:  The patient was admitted to the hospital under the care of Dr. Dominguez.     Impression & Plan     CMS Diagnoses:   The patient has signs of Severe Sepsis        If one the following conditions is present, a 30 mL/kg bolus is recommended as part of the 6 hour bundle (IBW can be used for BMI >30, or document refusal/contraindication):      1.   Initial hypotension  defined as 2 bps < 90 or map < 65 in the 6hrs before or 6hrs after time zero.     2.  Lactate >4.     The patient has signs of Severe Sepsis as evidenced by:    1. 2 SIRS criteria, AND  2. Suspected infection, AND   3. Organ dysfunction:  SBP <90, MAP < 65, or SBP decrease of >40 from baseline due to infection    Time severe sepsis diagnosis confirmed: 0737 08/31/21 as this was the time when Lactate resulted, and the level was > 2.0 and cxr was reviewed by me and patient was hypotensive    3 Hour Severe Sepsis Bundle Completion:    1. Initial Lactic Acid Result:   Recent Labs   Lab Test 08/31/21  0913 08/31/21  0727 08/31/21  0723   LACT 1.1 1.2 1.0     2. Blood Cultures before Antibiotics: Yes  3. Broad Spectrum Antibiotics Administered:  yes       Anti-infectives (From admission through now)    Start     Dose/Rate Route Frequency Ordered Stop    08/31/21 0905  vancomycin (VANCOCIN) 1000 mg in dextrose 5% 200 mL PREMIX      1,000 mg  200 mL/hr over 1 Hours Intravenous ONCE 08/31/21 0901      08/31/21 0740  piperacillin-tazobactam (ZOSYN) 4.5 g vial to attach to  mL bag     Note to Pharmacy: For [facility], [facility] and [facility]: For Zosyn-naive patients, use the \"Zosyn initial dose + extended infusion\" order panel.    4.5 g  over 30 Minutes Intravenous ONCE 08/31/21 0737 08/31/21 " 0828          4. Fluid volume administered in ED:  Full bolus NOT administered due to CHF and acute Pulmonary Edema    BMI Readings from Last 1 Encounters:   08/31/21 17.56 kg/m      30 mL/kg fluids based on weight: 1,670 mL  30 mL/kg fluids based on IBW (must be >= 60 inches tall): 2,190 mL                Severe Sepsis reassessment:  1. Repeat Lactic Acid Level: 1.1 @ 9:13  2. not required    I attest to having performed a repeat sepsis exam and assessment of perfusion at 0800 and the results demonstrate improved perfusion. mentating slightly better and oxygenating well    Medical Decision Making:  Latisha Garay is a 81 year old gentlemen brought into the emergency room by EMS with very little information. Complaints of respiratory distress from staff this morning. I was able to speak with wife on the telephone, she thought he was having trouble breathing last night. She denies any aspiration. He does have a history of dysphagia and she states he does not get a thickened diet regularly so certainly could have aspirated something. He does require most total cares at the facility. He does not speak in really other than yes and no answers normally.     Patient was put on BIPAP immediately upon arrival due to his report of low oxygen saturations and tachypnea here. He did stabilize and oxygenate well on this. We did get an ISTAT VBG and his pH was 7.17 with a pCO2 of 64 so there appears to be an element of respiratory acidosis. After being on BIPAP for an hour and a half those were repeated and the pH was received at 7.29 and the pCO2 is now normalized. So he seems to be doing well on BIPAP. Portable chest x-ray was obtained and I was able to view this at bedside. There is diffuse patchy infiltrates on the right greater than left which is concerning for aspiration pneumonia so he was started on Zosyn right away as well as Vancomycin considering he has been in the hospital the past two weeks. He was straight cathed for  urine. He had 200 ccs. There is not convincing evidence of UTI on this. He is Covid-19 negative and is vaccinated. His INR is slightly elevated at 1.3. His CBC shows normal platelets and white count is also noted to be normal. His troponin is slightly elevated and his BNP is significantly elevated however he clinically looks dehydrated and not fluid overloaded. His troponin may be elevated due to being hypoxic earlier today. His EKG does not show any ischemia so at this time I will not heparinized him. His lactic acid and procalcitonin are both normal. His creatinine is elevated and he does have an element of chronic kidney disease. At the beginning of the month his creatinine was 3.0.     After receiving a 500 ml bolus on arrival for his hypotension his blood pressure did come up to about 123/80s so he was administered LR at maintenance. Again after seeing his BNP I did not want to put him into fluid overload, so he did not get a full 30 ml/kg bolus but was backed up to maintenance infusion of IV fluids. On his previous hospitalization at [facility] for hypotension it sounds like it was fluid related as well. It looks like he had an echo earlier this year in March of 2021 with an EF of 58% and some mild valvular disease.     At this time the patient is stable. He will be admitted to [facility] on BIPAP under Dr. Dominguez.    Critical Care Time: was 60 minutes for this patient excluding procedures    Covid-19  Latisha Garay was evaluated during a global COVID-19 pandemic, which necessitated consideration that the patient might be at risk for infection with the SARS-CoV-2 virus that causes COVID-19.   Applicable protocols for evaluation were followed during the patient's care.   COVID-19 was considered as part of the patient's evaluation. The plan for testing is:  a test was obtained during this visit.    Diagnosis:    ICD-10-CM    1. Pneumonia due to infectious organism, unspecified laterality, unspecified part of lung  J18.9     2. Orthostatic hypotension  I95.1    3. Sepsis, due to unspecified organism, unspecified whether acute organ dysfunction present (H)  A41.9    4. Acute kidney injury (H)  N17.9    5. Acute respiratory failure with hypoxia (H)  J96.01    6. Respiratory acidosis  E87.2        Scribe Disclosure:  Jenn ZAFAR, am serving as a scribe at 7:13 AM on 8/31/2021 to document services personally performed by Barbara Parry MD based on my observations and the provider's statements to me.              Barbara Parry MD  08/31/21 [id number]

## 2021-09-01 NOTE — PROGRESS NOTES
"Notified Person Name: Diego     Notification Date/Time: 8/31 @ 2002     Purpose of Notification or Copy of Page: Clarification on inpatient status     Comments:   \"SAPPHIRE Quiroga.: patient on continuous bipap, no IMC orders. Need clarification. Thank you\"  "

## 2021-09-01 NOTE — PROGRESS NOTES
"Notified Person Name: Diego     Notification Date/Time: 8/31 2051     Purpose of Notification or Copy of Page: PATIENT pulling out lines/ hitting staff     Comments:      \"305 M.G. patient very agitated, trying to pull out all lines, alert to self, not redirectable. no prn medications available.\"  "

## 2021-09-01 NOTE — PROGRESS NOTES
[facility]    Medicine Progress Note - Hospitalist Service       Date of Admission:  8/31/2021    Assessment & Plan           Latisha Garay is an 81 year old male with history of stage IV chronic kidney disease with baseline creatinine 3.0, chronic anemia, secondary hyperparathyroidism, hypertension, sebaceous carcinoma, dysphagia, BPH s/p TURP and advanced Parkinson's disease with recent decline in functional status over past several months now wheelchair bound with 24 hour extended RN care at his [facility] who presents with shortness of breath.     Healthcare associated pneumonia with possible aspiration.  Acute hypoxic and hypercarbic respiratory failure.  Chest x-ray shows right-sided pneumonia.  SARS-CoV-2 was negative.  Patient was started on BiPAP with oxygen's in the 60% range prior to admission.  Initial VBG showed PCO2 of 64, PCO2 of 52 and pH of 7.17.  Repeat VBG after 1 hour on BiPAP showed improvement PCO2 of 49, PO2 of 25, with pH of 7.29.  - UA- WBCs 12, few bacteria, RBCs 8  - UC/BC pending  - has h/o dysthagia with suspected aspiration PNA; it seems that he was on dysphagia diet for many years and earlier this year signed a waiver to start doing clear liquids at his facility,  - see discussion about dysphagia below  - started on Zosyn/Vanco on admission  - admitted to [facility]  - did not tolerate BiPAP last night, now sat well on Oxymask 2.5 liters  - ABG this am- improved pH 7.34, pCO2 38  - no fever, no leucocytosis  - continue Zosyn for now, stop Vanco given CKD  - proBNP elevated ~12,000 but he does not look fluid overload, lungs are clear, no leg swellings; elevated proBNP likely related to CKD  - continue npo until SLP eval  - change iv fluids to D5 1/2 NS at 75 ml/h  - monitor fever curve and WBCs trend  - as per admitting hospitalist- discussed CODE STATUS with his wife and for now she would like him to continue to be full code since this is his first episode of severe  pneumonia.  We discussed this in detail and she is going to continue to think about the risk of suffering versus the benefit of extending life.     ADDENDUM:   - notified by RN that the patient is lethargic; ABG does not show CO2 retentionl BS 64; change iv fluids to D5W at 75 ml/h  - also- BC 1/2 bottles from 8/31- positive for GPC in clusters; continue Zosyn, add Vanco again, repeat BC, follow up sensitivities, if not contaminant- will need ID consult.    Elevated troponin likely secondary to NSTEMI from demand ischemia related to his sepsis.  Had mild troponin elevation during last hospitalization in [facility] at 0.44 and was treated for hypotension thought to be from dehydration.    - Troponin on admission 0.081--0.159  - EKG on admission showed normal sinus rhythm without signs of active ischemia.    - Prior echocardiogram at [facility] on 3/19/2021 showed normal ejection fraction of 58% with a moderately enlarged left atrium and grade 2 left ventricular diastolic dysfunction.  - Discussed options with his wife and for now we will focus on treating his pneumonia and respiratory failure  - Echo   - repeat trop at 12pm  - Telemetry     Advanced Parkinson's Disease  Frequent Falls  PTA takes 1.5 carbidopa-levodopa  mg tabs four times daily.  He is wheelchair-bound and worsening.  Is mostly nonverbal.  Lives at assisted living with his wife and has 24-hour RN services.  - he is off BiPAP this am  - resume PTA Levodopa-Carbidopa QID  - he was agitated last night, Zyprexa 5 mg iv given  - fall precautions     Dysphagia  Chronic problem for many years. Saw ENT 4/2017 because 2 video swallows demonstrated silent aspiration with laryngeal penetration. ENT examination demonstrated functional vocal folds and no indication of laryngeal dysfunction. Unclear etiology, felt possibly related to Parkinson's. Has been following with speech therapy as outpatient and on dysphagia diet with nectar thick liquids, though per daughter  and wife, pt signed documents at nursing home stating he would like to resume thin liquids so this has been allowed in that setting.  - N.p.o. for now with concerns for aspiration pneumonia  - Consult SLP to look further into appropriate diet and make recommendations, this will be a difficult situation with advancing Parkinson's disease and patient refusing thickened liquids in the past.  His wife said she thinks she can convince him to do the dysphagia diet and would want to proceed with that.  She brought up the option of a feeding tube and I said that we usually do not do that for advanced Parkinson's disease as there is usually no clear medical benefit.     Stage IV CKD   Creainine 2.88 on admission. Baseline Cr ~2.6 to 3.0. Follows with Dr. Mike of [facility] as outpatient. Not felt to be a good dialysis candidate, should renal function further decline.  - cr 2.86 today  - BMP in AM     Chronic Anemia  Baseline over the past several months has been 7.8-9.2 from 11 range in late 2020. Has been following with oncology and nephrology in outpt setting and anemia felt to be related to CKD and being considered for initiation of EPO in the outpatient setting (oncology felt pt may also have possible mild additional component of iron deficiency or anemia of chronic disease and started empiric oral iron therapy, so far without much improvement). EGD 4/27 with some hematin in stomach, candidal esophagitis, and Izaguirre's but no overt bleeding source, colonoscopy not yet able to be completed (poor prep on 4/27).  - Hb 7.8 today  - CBC in AM  - ongoing outpt follow-up with hematology s/p discharge  - continue proton-pump inhibitor therapy for Izaguirre's  - continue PTA oral iron therapy    Left 1st MTP Ulcer  Assessment: Examination reveals developing pressure wound on the medial aspect of the Left 1st MTP.  - WOC consult    Sebaceous carcinoma  Removed from left ear in 2/2021. Follows with the [facility]  [place].  -Follow-up with oncology as arranged    Secondary hyperparathyroidism   PTA not on medication. Ca 8.5 on admission  -follow up as outpatient for continued monitoring as needed    Urinary Incontinence  BPH (benign prostatic hyperplasia)  Urinary retention  On oxybutynin for urinary incontinence, not on therapy for BPH. History of TURP.  - needed straight cathX2; place Danielle catheter  - hold oxybutynin for now        Diet: NPO for Medical/Clinical Reasons Except for: No Exceptions, Meds    DVT Prophylaxis: Heparin SQ  Danielle Catheter: Not present  Central Lines: None  Code Status: Full Code      Disposition Plan   Expected discharge: 09/04/2021   recommended to prior living arrangement once clinically improved.     The patient's care was discussed with the Bedside Nurse and Patient.    Ashley Alexander MD  Hospitalist Service  [facility]  Securely message with the Vocera Web Console (learn more here)  Text page via [employer] Paging/Directory      Clinically Significant Risk Factors Present on Admission           # Severe Malnutrition, POA: based on Reduced intake;Subcutaneous fat loss;Muscle loss;Fluid retention (09/01/21 0800)    - nutrition consult    ______________________________________________________________________    Interval History    Was agitated last night, Zyprexa 5 mg iv given  - in am sleeping, on facemask, no respiratory distress note    Data reviewed today: I reviewed all medications, new labs and imaging results over the last 24 hours. I personally reviewed the chest x-ray image(s) showing as above.    Physical Exam   Vital Signs: Temp: 98.7  F (37.1  C) Temp src: Axillary BP: 124/65 Pulse: 61   Resp: 18 SpO2: 97 % O2 Device: Nasal cannula Oxygen Delivery: 2 LPM  Weight: 122 lbs 5.68 oz     Constitutional: sleeping, no acute distress  Eyes: Conjunctiva and pupils examined and normal.  HEENT: Dry mucous membranes, normal dentition.  Respiratory: Crackles at the  right lung base, no wheezing.  Cardiovascular: Regular rate and rhythm, normal S1 and S2, and no murmur noted.  GI: Soft, non-distended, non-tender, normal bowel sounds.  Lymph/Hematologic: No anterior cervical or supraclavicular adenopathy.  Skin: No rashes, no cyanosis, no edema, 1 cm ulcer on the lateral aspect of his fifth MTP joint with no overt sign of infection.  Musculoskeletal: severe muscle loss noted; no joint swelling, erythema or tenderness.  Neurologic: sleeping, not able to assess      Data   Recent Labs   Lab 09/01/21  0555 08/31/21  0727   WBC 7.9 5.8   HGB 7.8* 8.6*   MCV 98 100   * 164   INR  --  1.33*   * 141   POTASSIUM 3.9 3.5   CHLORIDE 119* 112*   CO2 23 25   BUN 45* 44*   CR 2.86* 2.88*   ANIONGAP 4 4   HANNA 8.1* 8.5   GLC 63* 99   TROPONIN 0.150* 0.081*     No results found for this or any previous visit (from the past 24 hour(s)).  Medications     dextrose 5% and 0.45% NaCl       - MEDICATION INSTRUCTIONS -         carbidopa-levodopa  1 tablet Oral 4x Daily     carbidopa-levodopa  1 half-tab Oral 4x Daily     heparin ANTICOAGULANT  5,000 Units Subcutaneous Q12H     piperacillin-tazobactam  2.25 g Intravenous Q6H     sodium chloride (PF)  3 mL Intracatheter Q8H

## 2021-09-01 NOTE — PLAN OF CARE
Patient very drowsy and disoriented all day. He has refused to take pills. The only medications given to him have been medications given through the IV. 2 x assist with lift. Can be paranoid and may try to swing at people. Straight cathed twice today, and now has feng in place. Refuses repositioning. Seen by [facility] nurse today. New dressing change orders were placed. Vitals fairly stable. One slightly elevated BP. On 2 liters via simple mask. Blood culture came back as Gram positive cocci in clusters. MD notified. Full code. [facility] status. Tele shows sinus rhythm to sinus patria. D5% @ 75/hr.

## 2021-09-01 NOTE — PROVIDER NOTIFICATION
MD Notification    Notified Person: MD    Notified Person Name: Dr. Fabianagustin    Notification Date/Time: 9/1/21 - 1750    Notification Interaction: Paged    Purpose of Notification: just an informative page that the patient had positive blood cultures of Gram positive cocci in clusters.    Orders Received:    Comments: Patient already on I.V. Zosyn.

## 2021-09-01 NOTE — PROGRESS NOTES
End of Shift Note: Patient admitted to unit for resp acidosis, orthostatic hypotension, VIKA. Patient is alert and oriented x1 to self, Assist x2 with activity. VSS, oxygen maintaing above 90% on 2L, patient refused bipap, ABG's ordered. Abdominal muscle use, shallow breathing present. Aggression Stop Light yellow- verbally aggressive with staff, hitting staff during cares & threatening. Patient unable to rate pain, painad score used. Attempted to use mittens to prevent pt from pulling out lines, ineffective, long arm for overnight. Wound care to 5th MTP completed. New skin tear present on left hand, foam & guaze used.

## 2021-09-01 NOTE — CONSULTS
CLINICAL NUTRITION SERVICES  -  ASSESSMENT NOTE      Malnutrition: % Weight Loss:  Unable to determine   % Intake:  </= 50% for >/= 5 days (severe malnutrition)(suspecting but unable to confirm)  Subcutaneous Fat Loss:  Orbital region moderate depletion, Upper arm region severe depletion and Thoracic region severe depletion  Muscle Loss:  Temporal region moderate depletion, Clavicle bone region severe depletion, Acromion bone region severe depletion, Patellar region moderate depletion and Posterior calf region moderate depletion  Fluid Retention:  Moderate as above     Malnutrition Diagnosis: Severe malnutrition  In Context of:  Acute illness or injury        REASON FOR ASSESSMENT  Latisha Garay is a 81 year old male seen by Registered Dietitian for Admission Nutrition Risk Screen for Have you recently lost weight without trying? - Yes, unsure of amount   Have you eaten poorly because of a decreased appetite? - Yes     NUTRITION HISTORY  - Information obtained from [facility] and NH records as patient currently sleeping and not responding to my presence.  He was also agitated and combative overnight so did not fully attempt to wake patient.  Per NH records, he was on a regular diet and not receiving any oral nutritional supplements.  On admission it was reported that he has had a reduced appetite but time frame not specified.       CURRENT NUTRITION ORDERS  Diet Order:     NPO     Current Intake/Tolerance:  N/A      NUTRITION FOCUSED PHYSICAL ASSESSMENT FOR DIAGNOSING MALNUTRITION)  Yes - Mostly visual in attempt to avoid waking patient who was recently aggressive and combative/hitting staff              Observed:    Muscle wasting (refer to documentation in Malnutrition section) and Subcutaneous fat loss (refer to documentation in Malnutrition section)    Obtained from Chart/Interdisciplinary Team:  WOCN eval pending for lateral left MTP joint has 1 cm ulcer  Edema - Scrotal (3+)    ANTHROPOMETRICS  Height:  "5'10\"  Weight: 55.5 kg (122#)(8/31)  Body mass index is 17.56 kg/m   Weight Status:  Underweight BMI <18.5  IBW: 75.5 kg   % IBW: 74%  Weight History:   Wt Readings from Last 10 Encounters:   08/31/21 55.5 kg (122 lb 5.7 oz)     Per Care Everywhere:  8/10/21 = 100#  8/14/21 = 110#    LABS  BUN 45 (H), Cr 2.86 (H) - [facility]    MEDICATIONS  Medications reviewed      ASSESSED NUTRITION NEEDS PER APPROVED PRACTICE GUIDELINES:    Dosing Weight 55.5 kg   Estimated Energy Needs: 8494-5253 kcals (35-40 Kcal/Kg)  Justification: repletion and underweight  Estimated Protein Needs:  grams protein (1.5-2 g pro/Kg)  Justification: repletion and hypercatabolism with acute illness  Estimated Fluid Needs: 4163-7340 mL (1 mL/Kcal)  Justification: maintenance    MALNUTRITION:  % Weight Loss:  Unable to determine   % Intake:  </= 50% for >/= 5 days (severe malnutrition)(suspecting but unable to confirm)  Subcutaneous Fat Loss:  Orbital region moderate depletion, Upper arm region severe depletion and Thoracic region severe depletion  Muscle Loss:  Temporal region moderate depletion, Clavicle bone region severe depletion, Acromion bone region severe depletion, Patellar region moderate depletion and Posterior calf region moderate depletion  Fluid Retention:  Moderate as above     Malnutrition Diagnosis: Severe malnutrition  In Context of:  Acute illness or injury    NUTRITION DIAGNOSIS:  Inadequate protein-energy intake related to NPO status as evidenced by meeting 0% needs     NUTRITION INTERVENTIONS  Recommendations / Nutrition Prescription  ADAT per SLP eval (pending)    Implementation  Nutrition education: Not appropriate at this time due to patient condition    Nutrition Goals  Patient will advance diet within the next 24-48 hours     MONITORING AND EVALUATION:  Progress towards goals will be monitored and evaluated per protocol and Practice Guidelines    Robyn Hernandez, RD, LD, CNSC   Clinical Dietitian - [facility] " [facility]

## 2021-09-01 NOTE — PLAN OF CARE
"    SLP - Unable to see pt for a SLP swallow evaluation due to poor RIN.  Will check status 9/2.  Note: Patient with a history of silent aspiration of thin liquids on video swallow study at outside facility. Pt \"signed a waiver\" to receive thin liquids despite aspiration risk previously.    "

## 2021-09-01 NOTE — PROVIDER NOTIFICATION
MD Notification    Notified Person: MD    Notified Person Name: Dr. Alexander    Notification Date/Time: 9/1/21 - 9376    Notification Interaction: paged    Purpose of Notification: patient had one episode of patria cardia in the mid-40s. Patient also has not taken any of his medications except IV ABX. Patient's blood glucose was @ 64 when tested.    Orders Received: stat ABG ordered and changing IV fluids from D5%1/2NS to D5% in water.    Comments:

## 2021-09-01 NOTE — CONSULTS
[facility] Nurse Inpatient Wound Assessment     Reason for consultation: Evaluate and treat left foot      Assessment  Left medial foot: community-acquired pressure injury, likely Stage 3, no s/s infection.  Pt lies rigidly on his right side with left leg curled up and medial left foot pressing into bed unless cushioned.      Left dorsal hand: new large skin tear, shallow and clean    Treatment Plan  Left medial foot: every other day and prn:  1.  Cleanse with MicroKlenz  2.  Swab periwound with skin prep, let dry  3.  Apply pea-size amount Iodosorb gel to center of Mepilex 4x4, the press onto wound  4.  Ensure heels, ankles, and bony areas of feet are always cushioned/offloaded.  Consider Prevalon boots (# [id number]).  5.  Keep feet moisturized with Sween 24 cream    Left hand skin tear: every other day and prn.  (Note: when less drainage, can decrease to every 3-4 days):  1.  Slowly remove old dressing  2.  Cleanse with MicroKlenz  3.  Swab periwound with skin prep prn, let dry  4.  Cover with one and a half Optifoam Gentle Border dressings (do not use Mepilex as this will stick to wound bed)    Pressure Injury Prevention (PIP) Plan:  -If patient is declining pressure injury prevention interventions: Explore reason why and address patient's concerns  -Mattress: Pulsate  -HOB: Maintain at or below 30 degrees, unless contraindicated  -Repositioning in bed: Every 1-2 hours , Left/right positioning; avoid supine and Raise foot of bed prior to raising head of bed, to reduce patient sliding down (shear)  -Heels: Keep elevated off mattress and Pillows under calves and consider Prevalon boots #[id number]  -Protective Dressing: Sacral Mepilex for prevention (#[id number]),  especially for the agitated patient   -Chair positioning: Chair cushion (#[id number]) , Repositions self: patient to shift weight every 15 minutes and Assist patient to reposition hourly   -Moisture Management: Perineal cleansing  /protection: Follow Incontinence Protocol, Clean and dry skin folds with bathing  and Moisturize dry skin  -Under Devices: Inspect skin under all medical devices during skin inspection , Ensure tubes are stabilized without tension and Ensure patient is not lying on medical devices or equipment when repositioned      Orders Written  Recommended provider order: None, at this time  WO Nurse follow-up plan: weekly  Nursing to notify the Provider(s) and re-consult the WOC Nurse if wound(s) deteriorates or new skin concern.    Patient History  According to provider note(s):  Latisha Garay is an 81 year old male with history of stage IV chronic kidney disease with baseline creatinine 3.0, chronic anemia, secondary hyperparathyroidism, hypertension, sebaceous carcinoma, dysphagia, BPH s/p TURP and advanced Parkinson's disease with recent decline in functional status over past several months now wheelchair bound with 24 hour extended RN care at his [facility] who presents with shortness of breath.    Objective Data  Containment of urine/stool: Incontinence Protocol    Active Diet Order:  Orders Placed This Encounter      NPO for Medical/Clinical Reasons Except for: No Exceptions, Meds    Output:   I/O last 3 completed shifts:  In: 2122 [I.V.:1922; IV Piggyback:200]  Out: 550 [Urine:550]    Risk Assessment:   Sensory Perception: 2-->very limited  Moisture: 4-->rarely moist  Activity: 2-->chairfast  Mobility: 2-->very limited  Nutrition: 2-->probably inadequate  Friction and Shear: 2-->potential problem  Rakesh Score: 14                          Labs: Recent Labs   Lab 09/01/21  0555 08/31/21  0727   HGB 7.8* 8.6*   INR  --  1.33*   WBC 7.9 5.8       Physical Exam  Skin inspection: focused feet, left hand, coccyx    Wound Location:  Left foot - medial 1st MTP   Date of last photo:  9-1-21      Wound History: hard to fully visualize today as unable to get pt to turn to his left side or stretch out left leg.  Pt tends to curl up  tightly on his right side and is somewhat rigid; has dx Parkinsons.  Left medial foot therefore seems to press frequently into the bed unless cushioned.  Previous combative behaviors per nursing, was given antipsychotic med last night and remains somewhat sedate today at [facility] assessment.    Measurements (length x width x depth, in cm):  approx 1.5 x 1.2 x 0.2cm     Wound Base: filmy slick yellow-pink tissue  Tunneling: N/A  Undermining: N/A  Palpation of the wound bed: normal, area feels firm/close to bone  Periwound skin: mild maceration and erythema. Left medial heel slightly reddened/boggy but intact and blanchable.    Color: pink  Temperature: normal   Drainage: small  Description of drainage: serosanguinous  Odor: none  Pain: unclear      Wound Location:  Left dorsal hand  Date of last photo:  9-1-21      Wound History: per report, pt was combative last night and mitts were attempted but not very successful and at some point pt sustained a skin tear.  Prior gauze and foam dressing was saturated with serosang drainage.   Measurements (length x width x depth, in cm): 8 x max 2.5 x 0.1cm   Wound Base: moist red-yellow dermis  Tunneling: N/A  Undermining: N/A  Palpation of the wound bed: normal   Periwound skin: intact  Color: normal and consistent with surrounding tissue  Temperature: normal   Drainage: moderate  Description of drainage: serosanguinous  Odor: none  Pain: no withdrawing or verbal complaints    Left elbow: mild bruising and tiny abrasion  Coccyx: reddened but blanchable and intact.    Per nursing, right hip/side clear and intact.     Interventions  Current support surface: Standard  Atmos Air mattress; Nursing has already ordered Pulsate low air loss mattress  Current off-loading measures: Pillows  Visual inspection of wound(s) completed  Wound Care: done per plan of care  Supplies: reviewed, gathered and placed at the bedside  Education provided: importance of repositioning, plan of care, wound  progress, Moisture management and Off-loading pressure  Discussed plan of care with Patient and Nurse    Mariah Aguillon RN, CWOCN

## 2021-09-02 NOTE — PLAN OF CARE
Vital signs stable on BIPAP post RRT (please see house officer note for updates). Alert and oriented to self, and more lethargic this shift. Lung sounds diminished with productive weak cough. Bowel sounds active, flatus present. Left hand dressing changed, right toe dressing clean dry and intact . Patient not exhibiting sypmptoms of pain. Up with lift. Tolerating NPO until more alert and speech can consult. CMS/neuros at baseline. Tele normal sinus post RRT interventions (was A-fib RVR).

## 2021-09-02 NOTE — PLAN OF CARE
"SLP: Pt not appropriate for SLP evaluation this am. Discussed with RN. Despite Bipap, with restorative goals of care, would continue to administer PD meds either sublingual (oral care/wet sponge to moisten under tongue, administer sublingual meds and replace Bipap mask) or  agree with RRT note that NG tube may be indicated.    Will continue to follow and formally evaluate when pt is appropriate. Vocera \"Jia Quaas\" or \"Speech Therapy.\"   "

## 2021-09-02 NOTE — PROGRESS NOTES
Dr. Salvador paged regarding blood cultures positive for staph aureus. Writer instructed previously ordered Vancomycin will cover.

## 2021-09-02 NOTE — PLAN OF CARE
Alert to self only. VSS now on 6L, off bipap since this afternoon. Turn and repo q2. No signs of pain. IVF running. Tele Sinus patria. NPO except ODT sinemet. Shashi patent. Family updated. Plan to continue to monitor tonight.

## 2021-09-02 NOTE — PROGRESS NOTES
[facility]    Medicine Progress Note - Hospitalist Service       Date of Admission:  8/31/2021    Assessment & Plan      Latisha Garay is an 81 year old male with history of stage IV chronic kidney disease with baseline creatinine 3.0, chronic anemia, secondary hyperparathyroidism, hypertension, sebaceous carcinoma, dysphagia, BPH s/p TURP and advanced Parkinson's disease with recent decline in functional status over past several months now wheelchair bound with 24 hour extended RN care at his skilled nursing who presents with shortness of breath.     Healthcare associated pneumonia with possible aspiration.  Acute hypoxic and hypercarbic respiratory failure.  Staph aureus bacteremia  Chest x-ray shows right-sided pneumonia.  SARS-CoV-2 was negative.  Patient was started on BiPAP with oxygen's in the 60% range prior to admission.  Initial VBG showed PCO2 of 64, PCO2 of 52 and pH of 7.17.  Repeat VBG after 1 hour on BiPAP showed improvement ,- UA- WBCs 12, few bacteria, RBCs 8, urine culture pending.  -Blood culture on admission positive for staph aureus, will repeat blood cultures daily, request infectious disease consult  - has h/o dysphagia with suspected aspiration PNA; it seems that he was on dysphagia diet for many years and earlier this year signed a waiver to start doing clear liquids at his facility,  - started on Zosyn/Vanco on admission, once her blood cultures are available vancomycin discontinued 9/2.  -see RRT note on 9/2, patient was placed on BiPAP, plan to wean it down possible later today, his proBNP is elevated, does not appear to be fluid overloaded but could try a dose of Lasix depending on respiratory status.  Patient is on D5  drip due to low blood sugars.  -Need speech evaluation prior to starting on diet.  -Please review RRT notes, following discussion with family CODE STATUS changed to DNR/DNI on 9/2.     Elevated troponin likely secondary to NSTEMI from demand ischemia  related to his sepsis.  Had mild troponin elevation during last hospitalization in [facility] at 0.44 and was treated for hypotension thought to be from dehydration.    - Troponin on admission 0.081--0.159  - EKG on admission showed normal sinus rhythm without signs of active ischemia.    - Prior echocardiogram at [facility] on 3/19/2021 showed normal ejection fraction of 58% with a moderately enlarged left atrium and grade 2 left ventricular diastolic dysfunction.  - Discussed options with his wife and for now we will focus on treating his pneumonia and respiratory failure  -Echo admission showing diastolic dysfunction, there is mild to moderate tricuspid regurgitation, with start for his bacteremia need to possibly acute ULISES for further evaluation.     Advanced Parkinson's Disease  Frequent Falls  PTA takes 1.5 carbidopa-levodopa  mg tabs four times daily.  He is wheelchair-bound and worsening.  Is mostly nonverbal.  Lives at assisted living with his wife and has 24-hour RN services.  - resume PTA Levodopa-Carbidopa QID     Dysphagia  Chronic problem for many years. Saw ENT 4/2017 because 2 video swallows demonstrated silent aspiration with laryngeal penetration. ENT examination demonstrated functional vocal folds and no indication of laryngeal dysfunction. Unclear etiology, felt possibly related to Parkinson's. Has been following with speech therapy as outpatient and on dysphagia diet with nectar thick liquids, though per daughter and wife, pt signed documents at nursing home stating he would like to resume thin liquids so this has been allowed in that setting.  - N.p.o. for now with concerns for aspiration pneumonia  - Consult SLP to look further into appropriate diet and make recommendations, this will be a difficult situation with advancing Parkinson's disease and patient refusing thickened liquids in the past.  His wife said she thinks she can convince him to do the dysphagia diet and would want to proceed with  that.  She brought up the option of a feeding tube and I said that we usually do not do that for advanced Parkinson's disease as there is usually no clear medical benefit.     Stage IV CKD   Creainine 2.88 on admission. Baseline Cr ~2.6 to 3.0. Follows with Dr. Mike of [facility] as outpatient. Not felt to be a good dialysis candidate, should renal function further decline.  - cr 2.7, elevated but stable.     Chronic Anemia  Baseline over the past several months has been 7.8-9.2 from 11 range in late 2020. Has been following with oncology and nephrology in outpt setting and anemia felt to be related to CKD and being considered for initiation of EPO in the outpatient setting (oncology felt pt may also have possible mild additional component of iron deficiency or anemia of chronic disease and started empiric oral iron therapy, so far without much improvement). EGD 4/27 with some hematin in stomach, candidal esophagitis, and Izaguirre's but no overt bleeding source, colonoscopy not yet able to be completed (poor prep on 4/27).  - Hb 8.9 stable   - ongoing outpt follow-up with hematology s/p discharge  - continue proton-pump inhibitor therapy for Izaguirre's  - continue PTA oral iron therapy    Left 1st MTP Ulcer   Examination reveals developing pressure wound on the medial aspect of the Left 1st MTP.  - WOC consult    Sebaceous carcinoma  Removed from left ear in 2/2021. Follows with the [facility].  -Follow-up with oncology as arranged    Secondary hyperparathyroidism   PTA not on medication. Ca 8.5 on admission  -follow up as outpatient for continued monitoring as needed    Urinary Incontinence  BPH (benign prostatic hyperplasia)  Urinary retention  On oxybutynin for urinary incontinence, not on therapy for BPH. History of TURP.  - needed straight cathX2; place Danielle catheter  - hold oxybutynin for now        Diet: NPO for Medical/Clinical Reasons Except for: No Exceptions, Meds    DVT  Prophylaxis: Heparin SQ  Danielle Catheter: PRESENT, indication: Retention  Central Lines: None  Code Status: No CPR- Do NOT Intubate      Disposition Plan   Expected discharge: 09/04/2021   recommended to prior living arrangement once clinically improved.     The patient's care was discussed with the Bedside Nurse and Patient.    Nargis Foote MD  Hospitalist Service  [facility]  Securely message with the Vocera Web Console (learn more here)  Text page via [employer] Paging/Directory      # Severe Malnutrition, POA: based on Reduced intake;Subcutaneous fat loss;Muscle loss;Fluid retention (09/01/21 0800)    - nutrition consult    ______________________________________________________________________    Interval History    His review RRT notes, patient is on BiPAP, he is opening his eyes but does not answer any questions, discussed with the nursing, will try to to wean him off of the BiPAP and start on oxygen mask, currently he is n.p.o., continue IV fluids, plan is to do PT evaluation prior to starting on any diet, family updated.    Data reviewed today: I reviewed all medications, new labs and imaging results over the last 24 hours. I personally reviewed the chest x-ray image(s) showing as above.    Physical Exam   Vital Signs: Temp: 97.3  F (36.3  C) Temp src: Axillary BP: (!) 159/86 Pulse: (!) 49   Resp: 22 SpO2: 97 % O2 Device: BiPAP/CPAP Oxygen Delivery: 3 LPM  Weight: 122 lbs 5.68 oz   Constitutional: sleepy   Respiratory: bilateral scattered crackles+  Cardiovascular: Regular rate and rhythm, normal S1 and S2, and no murmur noted  GI: Normal bowel sounds, soft, non-distended, non-tender  Neuro : sleepy   Skin: No rashes, no cyanosis, no edema, 1 cm ulcer on the lateral aspect of his fifth MTP joint with no overt sign of infection.  Musculoskeletal: severe muscle loss noted; no joint swelling, erythema or tenderness.        Data   Recent Labs   Lab 09/02/21 0621 09/02/21  0124  09/02/21  0038 09/01/21  1447 09/01/21  1149 09/01/21  0555 08/31/21  0727   WBC 8.9  --   --   --   --  7.9 5.8   HGB 8.9*  --   --   --   --  7.8* 8.6*   MCV 98  --   --   --   --  98 100     --   --   --   --  145* 164   INR  --   --   --   --   --   --  1.33*   NA  --  144  --   --   --  146* 141   POTASSIUM  --  3.4  --   --   --  3.9 3.5   CHLORIDE  --  117*  --   --   --  119* 112*   CO2  --  22  --   --   --  23 25   BUN  --  47*  --   --   --  45* 44*   CR  --  2.79*  --   --   --  2.86* 2.88*   ANIONGAP  --  5  --   --   --  4 4   HANNA  --  8.5  --   --   --  8.1* 8.5   GLC  --  96 80 64*  --  63* 99   TROPONIN  --  0.266*  --   --  0.194* 0.150* 0.081*     No results found for this or any previous visit (from the past 24 hour(s)).  Medications     D5W 75 mL/hr at 09/02/21 0625     - MEDICATION INSTRUCTIONS -         sodium chloride 0.9%  500 mL Intravenous Once     carbidopa-levodopa  1 tablet Oral 4x Daily     carbidopa-levodopa  1 half-tab Oral 4x Daily     ferrous sulfate  325 mg Oral Daily     fluticasone  2 spray Both Nostrils Daily     heparin ANTICOAGULANT  5,000 Units Subcutaneous Q12H     omeprazole  40 mg Oral QAM     piperacillin-tazobactam  2.25 g Intravenous Q6H     sodium chloride (PF)  3 mL Intracatheter Q8H     vancomycin  750 mg Intravenous Q48H

## 2021-09-02 NOTE — CODE/RAPID RESPONSE
[facility]    RRT Note  9/2/2021   Time Called: 0043    RRT called for: rapid atrial fibrillation with RVR and hypotension    Assessment & Plan     New onset atrial fibrillation with RVR  Hypotension 2/2 to Atrial Fibrillation with RVR  Latisha Garay is an 81-year-old male with a a complex past medical history including stage IV chronic kidney disease, chronic anemia, hypertension, advanced Parkinson's disease with recent decline in functional status who was admitted on 8/31/2021 following presenting to the ED for shortness of breath and found to have healthcare associated pneumonia with possible aspiration, acute hypoxic and hypercarbic respiratory failure, and positive blood cultures indicating staph aureus.  I was called to assess the patient following new onset atrial fibrillation with RVR which has resulted in subsequent hypotension.  Upon my arrival, the patient is lying in bed without any indication of acute distress.  He does not offer any verbal communication thus is unclear if he has any associated symptoms.  He does not have any increased work of breathing.  The patient has been delirious and combative at times this hospitalization and has not been taking his Parkinson's medications either and thus is very contracted and weak.  He looks very chronically ill and malnourished.  Heart rate are 120s to 140s atrial fibrillation on monitor, EKG indicates ST depression in V3 through 5, blood pressures 60s to 70s systolic.  The patient has had elevated proBNP as this admission around 12-13,000.  I will give gentle volume, replace electrolytes, and administer a small dose of IV metoprolol to assist with rate control.    I called the patient's wife to discuss the patient's current condition and change in heart rhythm to atrial fib which has led to profound hypotension.  We discussed the patient's overall progression with his Parkinson's disease which it appears that she is somewhat in  denial about how advanced his disease process has become over the last few months.  She reports that he is able to walk down the [place] with assistance whereas his niece reports that the patient is now requiring 2 people to pivot transfer from the bed to the wheelchair.  His niece reports a rapid weight loss over the past few months as well.  We discussed the risk of suffering versus the benefit of extending his life with aggressive measures including transfer to ICU for vasopressors and central line placement, intubation, and CPR.  Ultimately following very lengthy discussions between the patient's wife and his niece the decision was made to change his CODE STATUS to DNR/DNI and not proceed with escalation of cares to vasopressors.  At this time the patient's family does wish to continue all other restorative measures and cares.  If the family continues to wish for restorative measures I believe that in the near future we need to look at NG tube placement so the patient can receive his Parkinson's medications as nursing staff has been unable to to administer sublingual due to the combativeness.  Following volume administration of approximately 750 mL the patient developed a wet congested cough.  Started suctioning thick yellow sputum from his posterior pharynx.  Ideally due to the thick tenacious secretions BiPAP would be avoided however with his lungs now sounding more wet we will reinitiate BiPAP therapy for now.    INTERVENTIONS:  -NS 250ml bolus x1 now  -BMP, Mg, Phos, Troponin, LA, VBG stat  -Metoprolol 2.5mg IV x1 dose now  -Once again hypotension in the 60s after IVF bolus stopped, passive leg raise does indicate volume responsiveness thus additional 500ml NS bolus order x1  -DNR/DNI following lengthy discussion with wife and neice    At the end of the RRT will remain IMC status without plan to escalate care to vasopressors.    Discussed with and defer further cares to bedside nursing and flying  karie.    Interval History     Latisha Garay is a 81 year old male who was admitted on 8/31/2021 for HCAP.    Medical history significant for:  stage IV chronic kidney disease, chronic anemia, hypertension, advanced Parkinson's disease with recent decline in functional status  No past medical history on file.  No past surgical history on file.    Code Status: No CPR- Do NOT Intubate    Allergies   No Known Allergies    Physical Exam   Vital Signs with Ranges:  Temp:  [97.8  F (36.6  C)-99.5  F (37.5  C)] 97.8  F (36.6  C)  Pulse:  [] 128  Resp:  [16-28] 23  BP: ()/() 106/72  SpO2:  [92 %-98 %] 94 %  I/O last 3 completed shifts:  In: 1722 [I.V.:1722]  Out: 1400 [Urine:1400]    Constitutional: chronically ill-appearing, alert, contracted, no acute distress  ENT: mucus membranes dry   Neck: supple  Pulmonary: clear thoughout  Cardiovascular: irregularly, irregular rhythm and rapid rate, S1, S2, +murmur  GI: deferred  Skin/Integumen: pale, warm, dry, scabs to knees  Neuro: alert, no verbal communication, contracted  Psych:  OLLIE  Extremities: no peripheral edema    Data     EKG:  Interpreted by BRIAN Conroy CNP  Time reviewed: 0100  Symptoms at time of EKG: None   Rhythm: atrial fibrillation - rapid  Rate: 120-130  Axis: Normal  ST Segments/ T Waves: ST Segment Depression V2, V3, V4 and V5  Q Waves: none  Comparison to prior: new onset atrial fibrillation with RVR, possible ischemia v leads    Clinical Impression: atrial fibrillation (new onset)    ABG:  -  Recent Labs   Lab 09/02/21  0124 09/01/21  1529   PH  --  7.35   PCO2  --  36   PO2  --  74*   HCO3  --  20*   O2PER 3 30       Troponin:    Recent Labs   Lab Test 09/02/21  0124   TROPONIN 0.266*       IMAGING: (X-ray/CT/MRI)   Recent Results (from the past 24 hour(s))   Echocardiogram Complete   Result Value    LVEF  60-65%    Narrative    [id number]  [id number]  [id number]  [id number]^EVER^CARLEE^CIARAN     [facility]  Echocardiography Laboratory  [address] [place] [address]     Name: YAZMIN MAYEN  MRN: [id number]  : 1940  Study Date: 2021 01:03 PM  Age: 81 yrs  Gender: Male  Patient Location: [facility]  Reason For Study: SOB  Ordering Physician: CARLEE CURTIS  Referring Physician: none  Performed By: Nilda Langston RDCS     BSA: 1.7 m2  Height: 70 in  Weight: 122 lb  HR: 55  BP: 135/78 mmHg  ______________________________________________________________________________  Procedure  Complete Portable Echo Adult.  ______________________________________________________________________________  Interpretation Summary     There is mild concentric left ventricular hypertrophy.  The visual ejection fraction is 60-65%.  Grade I or early diastolic dysfunction.  The left atrium is moderately dilated.  There is mild to moderate (1-2+) tricuspid regurgitation.  There is mild aortic stenosis (aortic valve area > 1.0cm2).  There is no comparison study available.  ______________________________________________________________________________  Left Ventricle  The left ventricle is normal in size. There is mild concentric left  ventricular hypertrophy. Grade I or early diastolic dysfunction. The visual  ejection fraction is 60-65%.     Right Ventricle  The right ventricle is normal in structure, function and size.     Atria  The left atrium is moderately dilated. Right atrial size is normal.     Mitral Valve  The mitral valve leaflets appear thickened, but open well. There is moderate  mitral annular calcification. There is mild mitral stenosis.     Tricuspid Valve  Tricuspid leaflets are thickened. There is mild to moderate (1-2+) tricuspid  regurgitation. Right ventricular systolic pressure is elevated, consistent  with moderate pulmonary hypertension.     Aortic Valve  There is mild aortic stenosis (aortic valve area > 1.0cm2).     Pulmonic Valve  The pulmonic valve is not well seen, but is  grossly normal.     Vessels  The aortic root is normal size. The ascending aorta is Mildly dilated.  Dilation of the inferior vena cava is present with normal respiratory  variation in diameter.     Pericardium  There is no pericardial effusion.     Rhythm  Sinus rhythm was noted.  ______________________________________________________________________________  MMode/2D Measurements & Calculations  IVSd: 1.3 cm     LVIDd: 4.5 cm  LVIDs: 1.6 cm  LVPWd: 1.2 cm  FS: 63.2 %  LV mass(C)d: 205.5 grams  LV mass(C)dI: 121.4 grams/m2  Ao root diam: 3.7 cm  LA dimension: 3.9 cm  asc Aorta Diam: 4.2 cm  LA/Ao: 1.0  LVOT diam: 2.0 cm  LVOT area: 3.1 cm2  LA Volume (BP): 116.0 ml  LA Volume Index (BP): 68.6 ml/m2  RWT: 0.54     Doppler Measurements & Calculations  MV E max galindo: 119.0 cm/sec  MV A max galindo: 157.9 cm/sec  MV E/A: 0.75  MV max P.0 mmHg  MV mean PG: 3.8 mmHg  MV V2 VTI: 54.2 cm  MVA(VTI): 2.0 cm2  MV dec time: 0.27 sec  Ao V2 max: 259.4 cm/sec  Ao max P.0 mmHg  Ao V2 mean: 192.5 cm/sec  Ao mean P.0 mmHg  Ao V2 VTI: 66.4 cm  KAREN(I,D): 1.6 cm2  KAREN(V,D): 1.7 cm2  LV V1 max P.3 mmHg  LV V1 max: 144.5 cm/sec  LV V1 VTI: 35.5 cm  SV(LVOT): 108.8 ml  SI(LVOT): 64.3 ml/m2  PA acc time: 0.10 sec  TR max galindo: 306.8 cm/sec  TR max P.6 mmHg  AV Galindo Ratio (DI): 0.56  KAREN Index (cm2/m2): 0.97  E/E' av.9  Lateral E/e': 25.6  Medial E/e': 30.2     ______________________________________________________________________________  Report approved by: Crista Moser 2021 02:35 PM             CBC with Diff:  Recent Labs   Lab Test 21  0555 21  0727   WBC 7.9 5.8   HGB 7.8* 8.6*   MCV 98 100   * 164   INR  --  1.33*        Lactic Acid:    Lab Results   Component Value Date    LACT 1.6 2021           Comprehensive Metabolic Panel:  Recent Labs   Lab 21  0124      POTASSIUM 3.4   CHLORIDE 117*   CO2 22   ANIONGAP 5   GLC 96   BUN 47*   CR 2.79*   GFRESTIMATED 20*   HANNA  8.5   MAG 1.7   PHOS 4.5       INR:    Recent Labs   Lab Test 08/31/21  0727   INR 1.33*       D-DIMER:  No results found for: DIMER    BNP:  No results found for: BNP    UA:  Recent Labs   Lab 08/31/21  0828   COLOR Light Yellow   APPEARANCE Clear   URINEGLC Negative   URINEBILI Negative   URINEKETONE Negative   SG 1.013   UBLD Trace*   URINEPH 5.0   PROTEIN 20 *   NITRITE Negative   LEUKEST Negative   RBCU 8*   WBCU 12*       Time Spent on this Encounter   I spent 94 minutes (8441 - 1527) of critical care time on the unit/floor managing the care of Latisha Garay. Upon evaluation, this patient had a high probability of imminent or life-threatening deterioration due to atrial fibrillation with RVR, hypotension, and extensive goal of care conversation, which required my direct attention, intervention, and personal management. 100% of my time was spent at the bedside counseling the patient and/or coordinating care regarding services listed in this note.    BRIAN Conroy CNP

## 2021-09-02 NOTE — CONSULTS
[facility]    Infectious Disease Consultation     Date of Admission:  8/31/2021  Date of Consult : 09/02/21    Assessment:  1. S.aureus -MSSA bacteremia low grade, likely source pneumonia vs skin source    2. Healthcare pneumonia with acute hypoxic/hypercarbic respiratory failure. Some concern for aspiration  3. Atrial fibrillation with RVR  4. CKD  5. Chronic medical conditions - Anemia of chronic disease, HTN, BPH s/p TURP, Parkinson's disease with dementia    Recommendations:  1. Follow cx sensitivities  2. Discontinue Vancomycin / Zosyn  3. Unasyn (renal adjusted dose) while in hospital to cover aspiration pneumonia with transition to Cefazolin at discharge to complete 2 week treatment for for MSSA bacteremia. Antibiotic dose may need further adjustment if renal functions change  4. Given suspected respiratory source, and low grade bacteremia with rapid clearance, do not suspect metastatic focus of infection, no additional diagnostics recommended at this time.      Kayla Abad MD    Reason for Consult    I was asked to evaluate this patient for antimicrobial recommendations for S.aureus MSSA bactermia    Primary Care Physician   Physician No Ref-Primary    Chief Complaint   Shortness of breath    History is obtained from medical records -patient unable to communicate    History of Present Illness   Latisha Garay is a 81 year old male with chronic kidney disease , anemia, secondary hyperparathyroidism, hypertension, sebaceous carcinoma, dysphagia, BPH s/p TURP and advanced Parkinson's disease with cognitive decline, who has been hospitalized with shortness of breath and found to have right lung pneumonia with low grade bacteremia related to s.aureus    He was recently hospitalized to an outside hospital with hypotension and elevated troponin. On presentation to the ED here he was hypotensive, no fever was noted, he was hypoxic requiring BiPAP. Troponin was elevated. Patient has been on  Vancomycin / Zosyn and ID has been asked to assist with antibiotic management.    A rapid response code was called today for atrial fibrillation with RVR. He appears confused today.    Antimicrobial therapy  8/31 Zosyn  8/2 Vancomycin    Past Medical History   I have reviewed this patient's medical history and updated it with pertinent information if needed.   No past medical history on file.    Past Surgical History   I have reviewed this patient's surgical history and updated it with pertinent information if needed.  No past surgical history on file.    Prior to Admission Medications   Prior to Admission Medications   Prescriptions Last Dose Informant Patient Reported? Taking?   acetaminophen (TYLENOL) 500 MG tablet   Yes Yes   Sig: Take 500 mg by mouth every 8 hours as needed for mild pain   carbidopa-levodopa (PARCOPA)  MG ODT 8/30/2021 at Unknown time  Yes Yes   Sig: Take 1.5 tablets by mouth 4 times daily    cholecalciferol 50 MCG (2000 UT) tablet 8/30/2021 at Unknown time  Yes Yes   Sig: Take 2,000 Units by mouth   ferrous sulfate (FEROSUL) 325 (65 Fe) MG tablet 8/30/2021 at Unknown time  Yes Yes   Sig: Take 325 mg by mouth   fluticasone (FLONASE) 50 MCG/ACT nasal spray 8/30/2021 at Unknown time  Yes Yes   Sig: Spray 2 sprays into both nostrils daily    melatonin 3 MG tablet 8/30/2021 at Unknown time  Yes Yes   Sig: Take 3 mg by mouth At Bedtime    melatonin 3 MG tablet   Yes Yes   Sig: Take 3 mg by mouth nightly as needed for sleep   omeprazole (PRILOSEC) 40 MG DR capsule 8/30/2021 at Unknown time  Yes Yes   Sig: Take 40 mg by mouth   oxybutynin (DITROPAN) 5 MG tablet 8/30/2021 at Unknown time  Yes Yes   Sig: Take 5 mg by mouth   senna (SENOKOT) 8.6 MG tablet   Yes Yes   Sig: Take 8.6 mg by mouth daily as needed    vitamin B-12 (CYANOCOBALAMIN) 1000 MCG tablet 8/30/2021 at Unknown time  Yes Yes      Facility-Administered Medications: None     Allergies   No Known Allergies    Immunization History    Immunization History   Administered Date(s) Administered     COVID-19,PF,Pfizer 04/28/2021, 05/19/2021     Tdap (Adacel,Boostrix) 05/21/2021       Social History   I have reviewed this patient's social history and updated it with pertinent information if needed. Latisha Garay      Family History   I have reviewed this patient's family history and updated it with pertinent information if needed.   No family history on file.    Review of Systems   Cannot be obtained due to patient factors     Physical Exam   Temp: 97.3  F (36.3  C) Temp src: Axillary BP: (!) 163/75 Pulse: 64   Resp: 16 SpO2: 95 % O2 Device: BiPAP/CPAP Oxygen Delivery: 3 LPM  Vital Signs with Ranges  Temp:  [97.3  F (36.3  C)-99.5  F (37.5  C)] 97.3  F (36.3  C)  Pulse:  [] 64  Resp:  [14-28] 16  BP: ()/() 163/75  FiO2 (%):  [50 %] 50 %  SpO2:  [92 %-97 %] 95 %  122 lbs 5.68 oz  Body mass index is 17.56 kg/m .    GENERAL APPEARANCE:  Awake, confused , frail appearing  EYES: Eyes grossly normal to inspection, PERRL and conjunctivae and sclerae normal  NECK: symmetric  RESP: lungs clear to auscultation - no rales, rhonchi or wheezes  CV: S1S2 , systolic murmur  ABDOMEN: soft, nontender, without hepatosplenomegaly or masses and bowel sounds normal  MS: no edema  SKIN: ecchymosis      Data   Component      Latest Ref Rng & Units 9/2/2021   WBC      4.0 - 11.0 10e3/uL 8.9   RBC Count      4.40 - 5.90 10e6/uL 2.81 (L)   Hemoglobin      13.3 - 17.7 g/dL 8.9 (L)   Hematocrit      40.0 - 53.0 % 27.5 (L)   MCV      78 - 100 fL 98   MCH      26.5 - 33.0 pg 31.7   MCHC      31.5 - 36.5 g/dL 32.4   RDW      10.0 - 15.0 % 14.3   Platelet Count      150 - 450 10e3/uL 151     Component      Latest Ref Rng & Units 9/2/2021   Lactic Acid      0.7 - 2.0 mmol/L 1.6   Troponin I      0.000 - 0.045 ug/L 0.266 ()     Component      Latest Ref Rng & Units 9/2/2021   Sodium      133 - 144 mmol/L 144   Potassium      3.4 - 5.3 mmol/L 3.4   Chloride       94 - 109 mmol/L 117 (H)   Carbon Dioxide      20 - 32 mmol/L 22   Anion Gap      3 - 14 mmol/L 5   Urea Nitrogen      7 - 30 mg/dL 47 (H)   Creatinine      0.66 - 1.25 mg/dL 2.79 (H)   Calcium      8.5 - 10.1 mg/dL 8.5   Glucose      70 - 99 mg/dL 96   GFR Estimate      >60 mL/min/1.73m2 20 (L)       Micro  8/31 1 set 1/4 bottles MSSA by verigene -sensitivity pending.Blood cx 9/1 , 9/2 pending /  8/31 urine cx negative    Imaging  8/31 CXR  CHEST ONE VIEW PORTABLE   8/31/2021 7:40 AM      HISTORY: Shortness of breath.     COMPARISON: None available.                                                                      IMPRESSION: Portable AP view of the chest was obtained. Mildly large  cardiac silhouette. Right lung predominant patchy airspace pulmonary  opacities, worrisome for infection. High lung volume, likely due to  underlying COPD changes. No significant pleural effusion or  Pneumothorax.

## 2021-09-03 NOTE — CONSULTS
Care Management Initial Consult    General Information  Assessment completed with: Spouse or significant other, VM-chart review, Other (call out to nursing ta [facility] at 9)Marylin  Type of CM/SW Visit: Initial Assessment    Primary Care Provider verified and updated as needed: Yes   Readmission within the last 30 days: no previous admission in last 30 days      Reason for Consult: discharge planning  Advance Care Planning: Advance Care Planning Reviewed: other (comment) (none on chart)          Communication Assessment  Patient's communication style: spoken language (English or Bilingual)    Hearing Difficulty or Deaf: no   Wear Glasses or Blind: no    Cognitive  Cognitive/Neuro/Behavioral: .WDL except  Level of Consciousness: other (see comments) (asleep)  Arousal Level: arouses to touch/gentle shaking, arouses to voice, opens eyes spontaneously  Orientation: disoriented to, place, time, situation  Mood/Behavior: cooperative  Best Language: 2 - Severe aphasia  Speech: whispers    Living Environment:   People in home: facility resident, spouse  Marylin  Current living Arrangements: assisted living  Name of Facility: [facility]   Able to return to prior arrangements: other (see comments) (TBD)       Family/Social Support:  Care provided by: other (see comments) (facility staff)  Provides care for: no one, unable/limited ability to care for self  Marital Status:   Wife, Other (specify) (niece)  Marylin       Description of Support System:           Current Resources:   Patient receiving home care services:       Community Resources:    Equipment currently used at home: walker, rolling, wheelchair, manual (4 wheeled walker)  Supplies currently used at home:      Employment/Financial:  Employment Status:          Financial Concerns: No concerns identified           Lifestyle & Psychosocial Needs:  Social Determinants of Health     Tobacco Use:      Smoking Tobacco Use:       Smokeless Tobacco Use:    Alcohol Use:      Frequency of Alcohol Consumption:      Average Number of Drinks:      Frequency of Binge Drinking:    Financial Resource Strain:      Difficulty of Paying Living Expenses:    Food Insecurity:      Worried About Running Out of Food in the Last Year:      Ran Out of Food in the Last Year:    Transportation Needs:      Lack of Transportation (Medical):      Lack of Transportation (Non-Medical):    Physical Activity:      Days of Exercise per Week:      Minutes of Exercise per Session:    Stress:      Feeling of Stress :    Social Connections:      Frequency of Communication with Friends and Family:      Frequency of Social Gatherings with Friends and Family:      Attends [employer] Services:      Active Member of Clubs or Organizations:      Attends Club or Organization Meetings:      Marital Status:    Intimate Partner Violence:      Fear of Current or Ex-Partner:      Emotionally Abused:      Physically Abused:      Sexually Abused:    Depression:      PHQ-2 Score:    Housing Stability:      Unable to Pay for Housing in the Last Year:      Number of Places Lived in the Last Year:      Unstable Housing in the Last Year:        Functional Status:  Prior to admission patient needed assistance:              Mental Health Status:          Chemical Dependency Status:                Values/Beliefs:  Spiritual, Cultural Beliefs, Advent Practices, Values that affect care: no ([place])               Additional Information:  Phone call to wife Marylin and nursing staff at [facility] to do initial assessment. Wife is hopeful for return to [facility]. Uncertain of discharge date yet. Call out to facility to discuss how much help he gets. Wife states they do everything but unsure if they could do a tube feeding. Await facility call.    Luna Goldstein RN   [facility]   Phone [phone]     DISPLAY PLAN FREE TEXT

## 2021-09-03 NOTE — PLAN OF CARE
Pt confused, agitated with care at times. Oral care and suction provided when pt cooperative. Turn and reposition frequently. Wound to R hand and R toe dressing change today in early am. RRT called d/t bradycardic HR dropped down to 20-30s, agonal breathing after repositioned and pt spit out his med. Please see RRT note for more details. Pt's wife notified. All line/tube removed. [facility] to [place] [facility]

## 2021-09-03 NOTE — PROGRESS NOTES
[facility]    Infectious Disease Progress Note    Date of Service (when I saw the patient): 09/03/2021     Assessment & Plan   Latisha Garay is a 81 year old male who was admitted on 8/31/2021.     Assessment:  1. S.aureus -MSSA bacteremia low grade, likely source pneumonia vs skin source    2. Healthcare pneumonia with acute hypoxic/hypercarbic respiratory failure. Some concern for aspiration  3. Atrial fibrillation with RVR  4. CKD  5. Chronic medical conditions - Anemia of chronic disease, HTN, BPH s/p TURP, Parkinson's disease with dementia     Recommendations:  1. Follow cx sensitivities  2. Unasyn (renal adjusted dose) while in hospital to cover aspiration pneumonia with transition to Cefazolin at discharge to complete 2 week treatment for for MSSA bacteremia. Antibiotic dose may need further adjustment if renal functions change  3. Given suspected respiratory source, and low grade bacteremia with rapid clearance, do not suspect metastatic focus of infection, no additional diagnostics recommended at this time.         Ritu De La Rosa MD    Interval History   No fever   6 L of O2   WBC stable   Lethargic unable to provide any meaningful history     Physical Exam   Temp: 97.6  F (36.4  C) Temp src: Oral BP: 102/54 Pulse: 59   Resp: (!) 40 SpO2: 92 % O2 Device: Oxymask Oxygen Delivery: 6 LPM  Vitals:    08/31/21 0800   Weight: 55.5 kg (122 lb 5.7 oz)     Vital Signs with Ranges  Temp:  [97.3  F (36.3  C)-98.6  F (37  C)] 97.6  F (36.4  C)  Pulse:  [49-74] 59  Resp:  [16-46] 40  BP: (102-163)/(54-94) 102/54  SpO2:  [91 %-97 %] 92 %    Constitutional: frail, very chronically ill appearing   Lungs: diminished breath sounds   Cardiovascular: Regular rate and rhythm, normal S1 and S2, and no murmur noted  Abdomen: Normal bowel sounds, soft, non-distended, non-tender  Skin: No rashes, no cyanosis, no edema  Other:    Medications     D5W 50 mL/hr at 09/03/21 0234     - MEDICATION INSTRUCTIONS -          sodium chloride 0.9%  500 mL Intravenous Once     ampicillin-sulbactam (UNASYN) IV  3 g Intravenous Q12H     carbidopa-levodopa  1 tablet Oral 4x Daily     carbidopa-levodopa  1 half-tab Oral 4x Daily     ferrous sulfate  325 mg Oral Daily     fluticasone  2 spray Both Nostrils Daily     heparin ANTICOAGULANT  5,000 Units Subcutaneous Q12H     omeprazole  40 mg Oral QAM     sodium chloride (PF)  3 mL Intracatheter Q8H       Data   All microbiology laboratory data reviewed.  Recent Labs   Lab Test 09/03/21  0702 09/02/21  0621 09/01/21  0555   WBC 9.6 8.9 7.9   HGB 9.0* 8.9* 7.8*   HCT 28.1* 27.5* 24.4*   MCV 98 98 98   * 151 145*     Recent Labs   Lab Test 09/03/21  0702 09/02/21  0124 09/01/21  0555   CR 2.71* 2.79* 2.86*     No lab results found.  No lab results found.    Invalid input(s): BRADLEY

## 2021-09-03 NOTE — PLAN OF CARE
Pt is A&O to self only, speech garbled and hoarse. VSS ex on O2 @ 6LPM via oxymask. Dsg to L hand skin tear changed x1, moderate amount of serosanguinous drainage. Dsg to L foot ulcer replaced d/t coming off. Wound care done to site per orders, new dsg is CDI. LS dim, V[place]LEGAS. BS+ hypo, BMx1-incontinent of stool. Danielle in place d/t retention, adequate UOP. Pt is assistx2, turn and repo, NPO ex ODT meds-ok per speech. Frequent oral cares provided. Tele: NSR. No nonverbal indicators of pain present. Pulsatte mattress ordered.

## 2021-09-03 NOTE — PROGRESS NOTES
09/03/21 0849   General Information   Onset of Illness/Injury or Date of Surgery 08/31/21   Referring Physician Slim Dominguez MD   Patient/Family Therapy Goal Statement (SLP) Pt would like water. Pt asking for his wife.   Pertinent History of Current Problem The pt is an 81 year old male with history of stage IV chronic kidney disease with baseline creatinine 3.0, chronic anemia, secondary hyperparathyroidism, hypertension, sebaceous carcinoma, dysphagia, BPH s/p TURP and advanced Parkinson's disease with recent decline in functional status over past several months now wheelchair bound with 24 hour extended RN care at his [facility] who presents with shortness of breath. Chest x-ray revealed right-sided pneumonia. Pt initially required continuous BiPAP, however has been weaned to oxygen mask. Clinical swallow evaluation ordered per MD to assess for readiness for PO.   General Observations Pt fatigued, however alert and able to follow simple commands. Pt attempting to expectorate secretions upon arrival.    Past History of Dysphagia Pt has hx of silent aspiration with thin liquids per VFSS x2 in 2017. Recommends were for modified diet (unsure of consistency) with thin liquids. Per chart review, pt's wife and pt have signed waiver that allow him to drink thin liquids (0) at nursing facility despite aspiration risk. Pt did see ENT in 2017 with laryngoscopy revealing function vocal folds.   Pain Assessment   Patient Currently in Pain No   Type of Evaluation   Type of Evaluation Swallow Evaluation   Oral Motor   Oral Musculature anomalies present   Structural Abnormalities none present   Mucosal Quality cracked;dry   Dentition (Oral Motor)   Dentition (Oral Motor) significant number of missing teeth;poor condition   Facial Symmetry (Oral Motor)   Facial Symmetry (Oral Motor) WNL   Lip Function (Oral Motor)   Lip Range of Motion (Oral Motor) unable/difficult to assess   Tongue Function (Oral Motor)   Tongue ROM (Oral  Motor) depression is impaired;elevation is impaired;protrusion is impaired;lateralization is impaired   Depression, Tongue ROM Impairment (Oral Motor) minimal impairment   Elevation, Tongue ROM Impairment (Oral Motor) minimal impairment   Protrusion, Tongue ROM Impairment (Oral Motor) moderate impairment   Lateralization, Tongue ROM Impairment (Oral Motor) bilateral;minimal impairment   Tongue Strength (Oral Motor) strength decreased;bilateral   Tongue Coordination/Speed (Oral Motor) reduced rate   Jaw Function (Oral Motor)   Jaw Function (Oral Motor) strength impairment (bite/clench)   Cough/Swallow/Gag Reflex (Oral Motor)   Volitional Throat Clear/Cough (Oral Motor) reduced strength   Volitional Swallow (Oral Motor) significantly delayed;effortful   Vocal Quality/Secretion Management (Oral Motor)   Vocal Quality (Oral Motor) breathy;hoarse;phonation breaks   Secretion Management (Oral Motor) wet/gurgly vocal quality  (mild at rest)   Comment, Vocal Quality/Secretion Management (Oral Motor) Able to produce clear vocal quality with cues to throat clear   General Swallowing Observations   Current Diet/Method of Nutritional Intake (General Swallowing Observations, NIS) NPO   Respiratory Support (General Swallowing Observations) oxygen mask  (6 lpm)   Swallowing Evaluation Clinical swallow evaluation   Clinical Swallow Evaluation   Feeding Assistance dependent   Clinical Swallow Evaluation Textures Trialed thin liquids;mildly thick liquids   Clinical Swallow Eval: Thin Liquid Texture Trial   Mode of Presentation, Thin Liquids spoon;fed by clinician   Volume of Liquid or Food Presented ice chip x3   Oral Phase of Swallow Poor AP movement;Premature pharyngeal entry   Pharyngeal Phase of Swallow impaired;coughing/choking;reduction in laryngeal movement;throat clearing;wet vocal quality after swallow   Diagnostic Statement Complete anterior loss with initial ice chip. Overt s/s of aspiration with additional 2 trials of ice  chips including coughing, throat clearing, subtle wet vocal quality, and delayed swallow initiation.   Clinical Swallow Eval: Mildly Thick Liquids   Mode of Presentation spoon;fed by clinician   Volume Presented 1/2 tsp x1   Oral Phase poor AP movement;premature pharyngeal entry  (bolus holdin for >1 minute)   Pharyngeal Phase impaired;coughing/choking;throat clearing   Diagnostic Statement Bolus holding for >1 minute with no attempt to manipulate bolus. Pt initiated swallow with max verbal cues followed by coughing and throat clearing.   Esophageal Phase of Swallow   Patient reports or presents with symptoms of esophageal dysphagia No   Swallowing Recommendations   Diet Consistency Recommendations NPO;consider alternative means of nutrition   Medication Administration Recommendations, Swallowing (SLP) Recommend non-oral means of medication d/t suspected pocketing of sub-lingual medications   Instrumental Assessment Recommendations instrumental evaluation not recommended at this time   General Therapy Interventions   Planned Therapy Interventions Dysphagia Treatment   Dysphagia treatment Modified diet education;Instruction of safe swallow strategies;Compensatory strategies for swallowing   SLP Therapy Assessment/Plan   Criteria for Skilled Therapeutic Interventions Met (SLP Eval) yes;treatment indicated   SLP Diagnosis Severe oropharyngeal dysphagia   Rehab Potential (SLP Eval) fair, will monitor progress closely   Therapy Frequency (SLP Eval) daily   Predicted Duration of Therapy Intervention (SLP Eval) 2 weeks   Comment, Therapy Assessment/Plan (SLP) Clinical swallow evaluation completed per MD order. Pt presents with severe oropharyngeal dysphagia in setting of acute pneumonia, advanced Parkinson's, and hx of dysphagia. Oral mech is significant for generalized weakness and dried oral cavity. Vocal quality is hoarse/breathy and intermittently wet. Oral cares completed prior to initiation of PO trials. Complete  anterior loss of initial ice chip noted with poor bolus control. Grossly functional manipulation of following ice chips x2, however suspect premature spillage to pharynx. Significant bolus holding noted with mildly thick liquids via spoon with effortful A-P propulsion. Overt s/s of aspiration in presence/absence of PO including: coughing, throat clearing, and wet vocal quality (with ice chips). Following PO trials, pt able to expectorate a moderate amount of secretions with cued coughing; concern for residuals of medication offered sub-lingually last evening. Recommend strict NPO status with frequent oral cares. Recommend alternative means of medication/nutrition/hydration given severity of dysphagia and high aspiration risk. Do not anticipate rapid progression of swallow function given hx of dysphagia and current respiratory status. SLP will follow for PO readiness.   Therapy Plan Review/Discharge Plan (SLP)   Therapy Plan Review (SLP) evaluation/treatment results reviewed;care plan/treatment goals reviewed;risks/benefits reviewed;current/potential barriers reviewed;participants included   Demonstrates Need for Referral to Another Service (SLP) clinical nutrition services/dietitian   Recommendation for Referral Discussed with MD (SLP) NPO status   SLP Discharge Planning    SLP Discharge Recommendation (DC Rec) Transitional Care Facility   SLP Rationale for DC Rec Dysphagia is significantly below baseline function   SLP Brief overview of current status  Recommend strict NPO status with frequent oral cares. Recommend alternative means of medication/nutrition/hydration given severity of dysphagia and high aspiration risk. Do not anticipate rapid progression of swallow function given hx of dysphagia and current respiratory status.     Total Evaluation Time   Total Evaluation Time (Minutes) 23

## 2021-09-03 NOTE — DEATH PRONOUNCEMENT
MD DEATH PRONOUNCEMENT    Called to pronounce Latisha Garay dead.    Physical Exam: Unresponsive to noxious stimuli, Spontaneous respirations absent, Breath sounds absent, Carotid pulse absent and Heart sounds absent    Patient was pronounced dead at 15:35 PM, September 3, 2021.    Preliminary Cause of Death: Healthcare associated pneumonia with possible aspiration.  Acute hypoxic and hypercarbic respiratory failure.  Staph aureus bacteremia    Active Problems:    Orthostatic hypotension    Respiratory acidosis    Acute kidney injury (H)    Acute respiratory failure with hypoxia (H)    Pneumonia due to infectious organism, unspecified laterality, unspecified part of lung    Sepsis, due to unspecified organism, unspecified whether acute organ dysfunction present (H)       Infectious disease present?: NO    Communicable disease present? (examples: HIV, chicken pox, TB, Ebola, CJD) :  NO    Multi-drug resistant organism present? (example: MRSA): NO    Please consider an autopsy if any of the following exist:  NO Unexpected or unexplained death during or following any dental, medical, or surgical diagnostic treatment procedures.   NO Death of mother at or up to seven days after delivery.     NO All  and pediatric deaths.     NO Death where the cause is sufficiently obscure to delay completion of the death certificate.   NO Deaths in which autopsy would confirm a suspected illness/condition that would affect surviving family members or recipients of transplanted organs.     The following deaths must be reported to the 's Office:  NO A death that may be due entirely or in part to any factors other than natural disease (recent surgery, recent trauma, suspected abuse/neglect).   NO A death that may be an accident, suicide, or homicide.     NO Any sudden, unexpected death in which there is no prior history of significant heart disease or any other condition associated with sudden death.   NO A death under  suspicious, unusual, or unexpected circumstances.    NO Any death which is apparently due to natural causes but in which the  does not have a personal physician familiar with the patient s medical history, social, or environmental situation or the circumstances of the terminal event.   NO Any death apparently due to Sudden Infant Death Syndrome.     NO Deaths that occur during, in association with, or as consequences of a diagnostic, therapeutic, or anesthetic procedure.   NO Any death in which a fracture of a major bone has occurred within the past (6) six months.   NO A death of persons note seen by their physician within 120 days of demise.     NO Any death in which the  was an inmate of a public institution or was in the custody of Law Enforcement personnel.   NO  All unexpected deaths of children   NO Solid organ donors   NO Unidentified bodies   NO Deaths of persons whose bodies are to be cremated or otherwise disposed of so that the bodies will later be unavailable for examination;   NO Deaths unattended by a physician outside of a licensed healthcare facility or licensed residential hospice program   NO Deaths occurring within 24 hours of arrival to a health care facility if death is unexpected.    NO Deaths associated with the decedent s employment.   NO Deaths attributed to acts of terrorism.   NO Any death in which there is uncertainty as to whether it is a medical examiner s care should be discussed with the medical investigator.        Body disposition: Body released to the morgue. Family notified via phone, opted not to come in person, but was with patient on the phone.        BRIAN Alexander [facility]  Hospitalist Service  House Officer  Pager: [phone] (7a - 6p)

## 2021-09-03 NOTE — PROGRESS NOTES
[facility]    Medicine Progress Note - Hospitalist Service       Date of Admission:  8/31/2021    Assessment & Plan      Latisha Garay is an 81 year old male with history of stage IV chronic kidney disease with baseline creatinine 3.0, chronic anemia, secondary hyperparathyroidism, hypertension, sebaceous carcinoma, dysphagia, BPH s/p TURP and advanced Parkinson's disease with recent decline in functional status over past several months now wheelchair bound with 24 hour extended RN care at his correction who presents with shortness of breath.     Healthcare associated pneumonia with possible aspiration.  Acute hypoxic and hypercarbic respiratory failure.  Staph aureus bacteremia  Chest x-ray shows right-sided pneumonia.  SARS-CoV-2 was negative.  Patient was started on BiPAP with oxygen's in the 60% range prior to admission.  Initial VBG showed PCO2 of 64, PCO2 of 52 and pH of 7.17.  Repeat VBG after 1 hour on BiPAP showed improvement ,- UA- WBCs 12, few bacteria, RBCs 8, urine culture pending.  -Blood culture on admission positive for staph aureus, appreciate infectious disease input, current plan is to continue Unasyn, Zosyn discontinued, at the time of discharge possibly planning to discharge on cefazolin to complete 2-week course of treatment for MSSA.  - has h/o dysphagia with suspected aspiration PNA; it seems that he was on dysphagia diet for many years and earlier this year signed a waiver to start doing clear liquids at his facility, seen by speech therapy and they are recommending n.p.o. status and to find other options for nutrition, increased D5 drip to 75 mL/h, discussed with spouse, she is open for a G-tube placement, she will talk to the patient about it.  If patient is not in a place to tolerate diet by Sunday we should place IR consult for a G-tube placement on Monday 9/6.  Palliative and hospice options were discussed with the spouse, she does not seems to be prepared at go  that direction.  - started on Zosyn/Vanco on admission, once her blood cultures are available vancomycin discontinued 9/2.  Zosyn was also discontinued and patient was started on Unasyn on 9/2.  -see RRT note on 9/2, patient was placed on BiPAP, later on it was weaned off and patient is on 4 L of oxygen.  Continue n.p.o. status.  -Please review RRT notes, following discussion with family CODE STATUS changed to DNR/DNI on 9/2.  -Patient blood cultures ordered  Elevated troponin likely secondary to NSTEMI from demand ischemia related to his sepsis.  Had mild troponin elevation during last hospitalization in [facility] at 0.44 and was treated for hypotension thought to be from dehydration.    - Troponin on admission 0.081--0.159  - EKG on admission showed normal sinus rhythm without signs of active ischemia.    - Prior echocardiogram at [facility] on 3/19/2021 showed normal ejection fraction of 58% with a moderately enlarged left atrium and grade 2 left ventricular diastolic dysfunction.  - Discussed options with his wife and for now we will focus on treating his pneumonia and respiratory failure  -Echo admission showing diastolic dysfunction, there is mild to moderate tricuspid regurgitation, with start for his bacteremia need to possibly obtain ULISES for further evaluation.  Hypernatremia  --Hypovolemic, D5 increased to 75 ml per hour, cautious hydration due to history of diastolic heart failure.  BMP in a.m.  Advanced Parkinson's Disease  Frequent Falls  PTA takes 1.5 carbidopa-levodopa  mg tabs four times daily.  He is wheelchair-bound and worsening.  Is mostly nonverbal.  Lives at assisted living with his wife and has 24-hour RN services.  - resume PTA Levodopa-Carbidopa QID     Dysphagia  Chronic problem for many years. Saw ENT 4/2017 because 2 video swallows demonstrated silent aspiration with laryngeal penetration. ENT examination demonstrated functional vocal folds and no indication of laryngeal dysfunction.  Unclear etiology, felt possibly related to Parkinson's. Has been following with speech therapy as outpatient and on dysphagia diet with nectar thick liquids, though per daughter and wife, pt signed documents at nursing home stating he would like to resume thin liquids so this has been allowed in that setting.  - N.p.o. for now with concerns for aspiration pneumonia  -see above, patient in the past was resistant to have dysphagia diet, spouse is not ready to opt hospice care or palliative options, as per speech we need to look into alternate options for nutrition, with this situation even if G-tube does not provide much benefit considering his Parkinson's need to identify means of nutrition.  Spouse is open for G-tube placement.     Stage IV CKD   Creainine 2.88 on admission. Baseline Cr ~2.6 to 3.0. Follows with Dr. Mike of [facility] as outpatient. Not felt to be a good dialysis candidate, should renal function further decline.  - cr 2.7, elevated but stable.     Chronic Anemia  Baseline over the past several months has been 7.8-9.2 from 11 range in late 2020. Has been following with oncology and nephrology in outpt setting and anemia felt to be related to CKD and being considered for initiation of EPO in the outpatient setting (oncology felt pt may also have possible mild additional component of iron deficiency or anemia of chronic disease and started empiric oral iron therapy, so far without much improvement). EGD 4/27 with some hematin in stomach, candidal esophagitis, and Izaguirre's but no overt bleeding source, colonoscopy not yet able to be completed (poor prep on 4/27).  - Hb 8.9 stable   - ongoing outpt follow-up with hematology s/p discharge  - continue proton-pump inhibitor therapy for Izaguirre's  - continue PTA oral iron therapy    Left 1st MTP Ulcer   Examination reveals developing pressure wound on the medial aspect of the Left 1st MTP.  - WOC consult    Sebaceous carcinoma  Removed from left ear  in 2/2021. Follows with the [facility].  -Follow-up with oncology as arranged    Secondary hyperparathyroidism   PTA not on medication. Ca 8.5 on admission  -follow up as outpatient for continued monitoring as needed    Urinary Incontinence  BPH (benign prostatic hyperplasia)  Urinary retention  On oxybutynin for urinary incontinence, not on therapy for BPH. History of TURP.  - needed straight cathX2; place Danielle catheter  - hold oxybutynin for now        Diet: NPO for Medical/Clinical Reasons Except for: No Exceptions, Meds    DVT Prophylaxis: Heparin SQ  Danielle Catheter: PRESENT, indication: Retention  Central Lines: None  Code Status: No CPR- Do NOT Intubate      Disposition Plan   Expected discharge: 09/04/2021   recommended to prior living arrangement once clinically improved.     The patient's care was discussed with the Bedside Nurse and Patient.  Total time spend 45 min >50% spend on coordination of care including discussion about plan of care, hospice options, nutritional options etc.    Nargis Foote MD  Hospitalist Service  [facility]  Securely message with the Vocera Web Console (learn more here)  Text page via PublishThis Paging/Directory      # Severe Malnutrition, POA: based on Reduced intake;Subcutaneous fat loss;Muscle loss;Fluid retention (09/01/21 0800)    - nutrition consult    ______________________________________________________________________    Interval History    Patient is off of BiPAP and on 4 to 5 L of oxygen,very sleepy, spouse mentioned that he contacted in her and talk to her the day before, his baseline is moving around in a wheelchair, spouse is not ready to opt for hospice yet.    Data reviewed today: I reviewed all medications, new labs and imaging results over the last 24 hours. I personally reviewed the chest x-ray image(s) showing as above.    Physical Exam   Vital Signs: Temp: 97.8  F (36.6  C) Temp src: Axillary BP: 127/66 Pulse: 61    Resp: 21 SpO2: 97 % O2 Device: Oxymask Oxygen Delivery: 6 LPM  Weight: 122 lbs 5.68 oz   Constitutional: sleepy   Respiratory: bilateral scattered crackles+  Cardiovascular: Regular rate and rhythm, normal S1 and S2, and no murmur noted  GI: Normal bowel sounds, soft, non-distended, non-tender  Neuro : sleepy   Skin: No rashes, no cyanosis, no edema, 1 cm ulcer on the lateral aspect of his fifth MTP joint with no overt sign of infection.  Musculoskeletal: severe muscle loss noted; no joint swelling, erythema or tenderness.        Data   Recent Labs   Lab 09/03/21  0702 09/02/21  0621 09/02/21  0124 09/02/21  0038 09/01/21  1149 09/01/21  0555 08/31/21  0727 08/31/21  0727   WBC 9.6 8.9  --   --   --  7.9  --  5.8   HGB 9.0* 8.9*  --   --   --  7.8*  --  8.6*   MCV 98 98  --   --   --  98  --  100   * 151  --   --   --  145*  --  164   INR  --   --   --   --   --   --   --  1.33*   *  --  144  --   --  146*  --  141   POTASSIUM 3.8  --  3.4  --   --  3.9  --  3.5   CHLORIDE 118*  --  117*  --   --  119*  --  112*   CO2 20  --  22  --   --  23  --  25   BUN 45*  --  47*  --   --  45*  --  44*   CR 2.71*  --  2.79*  --   --  2.86*  --  2.88*   ANIONGAP 8  --  5  --   --  4  --  4   HANNA 8.6  --  8.5  --   --  8.1*  --  8.5   GLC 99  --  96 80  --  63*   < > 99   TROPONIN  --   --  0.266*  --  0.194* 0.150*  --  0.081*    < > = values in this interval not displayed.     No results found for this or any previous visit (from the past 24 hour(s)).  Medications     D5W 50 mL/hr at 09/03/21 0234     - MEDICATION INSTRUCTIONS -         sodium chloride 0.9%  500 mL Intravenous Once     ampicillin-sulbactam (UNASYN) IV  3 g Intravenous Q12H     carbidopa-levodopa  1 tablet Oral 4x Daily     carbidopa-levodopa  1 half-tab Oral 4x Daily     ferrous sulfate  325 mg Oral Daily     fluticasone  2 spray Both Nostrils Daily     heparin ANTICOAGULANT  5,000 Units Subcutaneous Q12H     omeprazole  40 mg Oral QAM     sodium  chloride (PF)  3 mL Intracatheter Q8H

## 2021-09-04 LAB
ATRIAL RATE - MUSE: 88 BPM
DIASTOLIC BLOOD PRESSURE - MUSE: NORMAL MMHG
INTERPRETATION ECG - MUSE: NORMAL
P AXIS - MUSE: NORMAL DEGREES
PR INTERVAL - MUSE: NORMAL MS
QRS DURATION - MUSE: 104 MS
QT - MUSE: 340 MS
QTC - MUSE: 480 MS
R AXIS - MUSE: 63 DEGREES
SYSTOLIC BLOOD PRESSURE - MUSE: NORMAL MMHG
T AXIS - MUSE: -71 DEGREES
VENTRICULAR RATE- MUSE: 120 BPM

## 2021-09-05 LAB — BACTERIA BLD CULT: NO GROWTH

## 2021-09-06 LAB
BACTERIA BLD CULT: NO GROWTH
BACTERIA BLD CULT: NO GROWTH

## 2021-09-07 ENCOUNTER — LAB REQUISITION (OUTPATIENT)
Dept: LAB | Facility: CLINIC | Age: 81
End: 2021-09-07
Payer: MEDICARE

## 2021-09-07 DIAGNOSIS — D63.1 ANEMIA IN CHRONIC KIDNEY DISEASE (CODE): ICD-10-CM

## 2021-09-07 LAB
BACTERIA BLD CULT: NO GROWTH
BACTERIA BLD CULT: NO GROWTH

## 2021-09-08 LAB
BACTERIA BLD CULT: NO GROWTH
BACTERIA BLD CULT: NO GROWTH

## 2021-09-11 NOTE — DISCHARGE SUMMARY
Discharge Summary  Hospitalist    Date of Admission:  8/31/2021  Date of Discharge:  9/11/2021  Discharging Provider: Nargis Foote MD    Discharge Diagnoses   Patient passed away during this admission.    History of Present Illness   Please review admission history and physical.    Hospital Course   Latisha Garay was admitted on 8/31/2021.  The following problems were addressed during his hospitalization:    Active Problems:    Orthostatic hypotension    Respiratory acidosis    Acute kidney injury (H)    Acute respiratory failure with hypoxia (H)    Pneumonia due to infectious organism, unspecified laterality, unspecified part of lung    Sepsis, due to unspecified organism, unspecified whether acute organ dysfunction present (H)  Latisha Garay is an 81 year old male with history of stage IV chronic kidney disease with baseline creatinine 3.0, chronic anemia, secondary hyperparathyroidism, hypertension, sebaceous carcinoma, dysphagia, BPH s/p TURP and advanced Parkinson's disease with recent decline in functional status over past several months now wheelchair bound with 24 hour extended RN care at his [facility] who presents with shortness of breath.     Healthcare associated pneumonia with possible aspiration.  Acute hypoxic and hypercarbic respiratory failure.  Staph aureus bacteremia  Chest x-ray shows right-sided pneumonia.  SARS-CoV-2 was negative.  Patient was started on BiPAP with oxygen's in the 60% range prior to admission.  Initial VBG showed PCO2 of 64, PCO2 of 52 and pH of 7.17.  Repeat VBG after 1 hour on BiPAP showed improvement ,- UA- WBCs 12, few bacteria, RBCs 8, urine culture pending.  -Blood culture on admission positive for staph aureus, appreciate infectious disease input, current plan is to continue Unasyn, Zosyn discontinued, at the time of discharge possibly planning to discharge on cefazolin to complete 2-week course of treatment for MSSA.  - has h/o  dysphagia with suspected aspiration PNA; it seems that he was on dysphagia diet for many years and earlier this year signed a waiver to start doing clear liquids at his facility, seen by speech therapy and they are recommending n.p.o. status and to find other options for nutrition, increased D5 drip to 75 mL/h, discussed with spouse, she is open for a G-tube placement, she will talk to the patient about it.  If patient is not in a place to tolerate diet by Sunday we should place IR consult for a G-tube placement on Monday 9/6.  Palliative and hospice options were discussed with the spouse, she does not seems to be prepared at go that direction.  - started on Zosyn/Vanco on admission, once his blood cultures are available vancomycin discontinued 9/2.  Zosyn was also discontinued and patient was started on Unasyn on 9/2.  -see RRT note on 9/2, patient was placed on BiPAP, later on it was weaned off and patient is on 4 L of oxygen.  Continue n.p.o. status.  -Please review RRT notes, following discussion with family CODE STATUS changed to DNR/DNI on 9/2.  -Patient blood cultures ordered patient was kept n.p.o. during his stay here, he suddenly passed away at 3:40 PM on 9/3.  Elevated troponin likely secondary to NSTEMI from demand ischemia related to his sepsis.  Had mild troponin elevation during last hospitalization in [facility] at 0.44 and was treated for hypotension thought to be from dehydration.    - Troponin on admission 0.081--0.159  - EKG on admission showed normal sinus rhythm without signs of active ischemia.    - Prior echocardiogram at [facility] on 3/19/2021 showed normal ejection fraction of 58% with a moderately enlarged left atrium and grade 2 left ventricular diastolic dysfunction.  - Discussed options with his wife and for now we will focus on treating his pneumonia and respiratory failure  -Echo admission showing diastolic dysfunction, there is mild to moderate tricuspid regurgitation, with start for his  bacteremia need to possibly obtain ULISES for further evaluation.  Hypernatremia  Patient was hypovolemic on admission, he was continued on D5 drip.  Advanced Parkinson's Disease  Frequent Falls  PTA takes 1.5 carbidopa-levodopa  mg tabs four times daily.  He is wheelchair-bound and worsening.  Is mostly nonverbal.  Lives at assisted living with his wife and has 24-hour RN services.  PTA meds were continued during the stay.     Dysphagia  Chronic problem for many years. Saw ENT 4/2017 because 2 video swallows demonstrated silent aspiration with laryngeal penetration. ENT examination demonstrated functional vocal folds and no indication of laryngeal dysfunction. Unclear etiology, felt possibly related to Parkinson's. Has been following with speech therapy as outpatient and on dysphagia diet with nectar thick liquids, though per daughter and wife, pt signed documents at nursing home stating he would like to resume thin liquids so this has been allowed in that setting.  - N.p.o. for now with concerns for aspiration pneumonia  -see above, patient in the past was resistant to have dysphagia diet, spouse is not ready to opt hospice care or palliative options, as per speech we need to look into alternate options for nutrition, with this situation even if G-tube does not provide much benefit considering his Parkinson's need to identify means of nutrition.  Spouse is open for G-tube placement.     Stage IV CKD   Creainine 2.88 on admission. Baseline Cr ~2.6 to 3.0. Follows with Dr. Mike of [facility] as outpatient. Not felt to be a good dialysis candidate, should renal function further decline.  - cr 2.7, elevated but stable.     Chronic Anemia  Baseline over the past several months has been 7.8-9.2 from 11 range in late 2020. Has been following with oncology and nephrology in outpt setting and anemia felt to be related to CKD and being considered for initiation of EPO in the outpatient setting (oncology felt pt may  also have possible mild additional component of iron deficiency or anemia of chronic disease and started empiric oral iron therapy, so far without much improvement). EGD  with some hematin in stomach, candidal esophagitis, and Izaguirre's but no overt bleeding source, colonoscopy not yet able to be completed (poor prep on ).  - Hb 8.9 stable       Left 1st MTP Ulcer   Examination reveals developing pressure wound on the medial aspect of the Left 1st MTP.  - WOC consult    Sebaceous carcinoma  Removed from left ear in 2021. Follows with the [facility].  -Follow-up with oncology as arranged    Secondary hyperparathyroidism   PTA not on medication. Ca 8.5 on admission  -follow up as outpatient for continued monitoring as needed    Urinary Incontinence  BPH (benign prostatic hyperplasia)  Urinary retention  On oxybutynin for urinary incontinence, not on therapy for BPH. History of TURP.  - needed straight cathX2; place Adnielle catheter  - hold oxybutynin for now      Nargis Foote MD    Significant Results and Procedures       Pending Results     Unresulted Labs Ordered in the Past 30 Days of this Admission     No orders found from 2021 to 2021.          Code Status   DNR / DNI       Primary Care Physician   Physician No Ref-Primary    Physical Exam                     Vitals:    21 0800   Weight: 55.5 kg (122 lb 5.7 oz)     Vital Signs with Ranges     No intake/output data recorded.    The patient was examined on the day of discharge.    Discharge Disposition   Patient  during this admission  Condition at discharge:     Consultations This Hospital Stay   PHARMACY TO DOSE VANCO  CARE MANAGEMENT / SOCIAL WORK IP CONSULT  SPEECH LANGUAGE PATH ADULT IP CONSULT  WOUND OSTOMY CONTINENCE NURSE  IP CONSULT  PHARMACY TO DOSE VANCO  INFECTIOUS DISEASES IP CONSULT    Time Spent on this Encounter   I, Nargis Foote MD, personally saw the patient today and spent greater than  30 minutes discharging this patient.    Discharge Orders   No discharge procedures on file.  Discharge Medications   Discharge Medication List as of 9/3/2021  6:11 PM      CONTINUE these medications which have NOT CHANGED    Details   acetaminophen (TYLENOL) 500 MG tablet Take 500 mg by mouth every 8 hours as needed for mild pain, Historical      carbidopa-levodopa (PARCOPA)  MG ODT Take 1.5 tablets by mouth 4 times daily , Historical      cholecalciferol 50 MCG (2000 UT) tablet Take 2,000 Units by mouth, Historical      ferrous sulfate (FEROSUL) 325 (65 Fe) MG tablet Take 325 mg by mouth, Historical      fluticasone (FLONASE) 50 MCG/ACT nasal spray Spray 2 sprays into both nostrils daily , Historical      !! melatonin 3 MG tablet Take 3 mg by mouth nightly as needed for sleep, Historical      !! melatonin 3 MG tablet Take 3 mg by mouth At Bedtime , Historical      omeprazole (PRILOSEC) 40 MG DR capsule Take 40 mg by mouth, Historical      oxybutynin (DITROPAN) 5 MG tablet Take 5 mg by mouth, Historical      senna (SENOKOT) 8.6 MG tablet Take 8.6 mg by mouth daily as needed , Historical      vitamin B-12 (CYANOCOBALAMIN) 1000 MCG tablet Historical       !! - Potential duplicate medications found. Please discuss with provider.        Allergies   No Known Allergies  Data   Most Recent 3 CBC's:Recent Labs   Lab Test 09/03/21  0702 09/02/21  0621 09/01/21  0555   WBC 9.6 8.9 7.9   HGB 9.0* 8.9* 7.8*   MCV 98 98 98   * 151 145*      Most Recent 3 BMP's:  Recent Labs   Lab Test 09/03/21  0702 09/02/21  0124 09/02/21  0038 09/01/21  0555   * 144  --  146*   POTASSIUM 3.8 3.4  --  3.9   CHLORIDE 118* 117*  --  119*   CO2 20 22  --  23   BUN 45* 47*  --  45*   CR 2.71* 2.79*  --  2.86*   ANIONGAP 8 5  --  4   HANNA 8.6 8.5  --  8.1*   GLC 99 96 80 63*     Most Recent 2 LFT's:  Recent Labs   Lab Test 08/05/21  0934   AST 7   ALT <9   ALKPHOS 62   BILITOTAL 0.5     Most Recent INR's and Anticoagulation Dosing  History:  Anticoagulation Dose History     Recent Dosing and Labs Latest Ref Rng & Units 2021    INR 0.85 - 1.15 1.33(H)        Most Recent 3 Troponin's:  Recent Labs   Lab Test 21  0124 21  1149 21  0555   TROPONIN 0.266* 0.194* 0.150*     Most Recent Cholesterol Panel:No lab results found.  Most Recent 6 Bacteria Isolates From Any Culture (See EPIC Reports for Culture Details):No lab results found.  Most Recent TSH, T4 and A1c Labs:No lab results found.  Results for orders placed or performed during the hospital encounter of 21   XR Chest Port 1 View    Narrative    CHEST ONE VIEW PORTABLE   2021 7:40 AM     HISTORY: Shortness of breath.    COMPARISON: None available.      Impression    IMPRESSION: Portable AP view of the chest was obtained. Mildly large  cardiac silhouette. Right lung predominant patchy airspace pulmonary  opacities, worrisome for infection. High lung volume, likely due to  underlying COPD changes. No significant pleural effusion or  pneumothorax.    LISA JOHNSON MD         SYSTEM ID:  [id number]   Echocardiogram Complete     Value    LVEF  60-65%    Narrative    [id number]  [id number]  [id number]  [id number]^EVER^CARLEE^CIARAN     [facility]  Echocardiography Laboratory  [address] [place] [address]     Name: YAZMIN MAYEN  MRN: [id number]  : 1940  Study Date: 2021 01:03 PM  Age: 81 yrs  Gender: Male  Patient Location: [facility]  Reason For Study: SOB  Ordering Physician: CARLEE CURTIS  Referring Physician: none  Performed By: Nilda Langston RDCS     BSA: 1.7 m2  Height: 70 in  Weight: 122 lb  HR: 55  BP: 135/78 mmHg  ______________________________________________________________________________  Procedure  Complete Portable Echo Adult.  ______________________________________________________________________________  Interpretation Summary     There is mild concentric left ventricular hypertrophy.  The visual  ejection fraction is 60-65%.  Grade I or early diastolic dysfunction.  The left atrium is moderately dilated.  There is mild to moderate (1-2+) tricuspid regurgitation.  There is mild aortic stenosis (aortic valve area > 1.0cm2).  There is no comparison study available.  ______________________________________________________________________________  Left Ventricle  The left ventricle is normal in size. There is mild concentric left  ventricular hypertrophy. Grade I or early diastolic dysfunction. The visual  ejection fraction is 60-65%.     Right Ventricle  The right ventricle is normal in structure, function and size.     Atria  The left atrium is moderately dilated. Right atrial size is normal.     Mitral Valve  The mitral valve leaflets appear thickened, but open well. There is moderate  mitral annular calcification. There is mild mitral stenosis.     Tricuspid Valve  Tricuspid leaflets are thickened. There is mild to moderate (1-2+) tricuspid  regurgitation. Right ventricular systolic pressure is elevated, consistent  with moderate pulmonary hypertension.     Aortic Valve  There is mild aortic stenosis (aortic valve area > 1.0cm2).     Pulmonic Valve  The pulmonic valve is not well seen, but is grossly normal.     Vessels  The aortic root is normal size. The ascending aorta is Mildly dilated.  Dilation of the inferior vena cava is present with normal respiratory  variation in diameter.     Pericardium  There is no pericardial effusion.     Rhythm  Sinus rhythm was noted.  ______________________________________________________________________________  MMode/2D Measurements & Calculations  IVSd: 1.3 cm     LVIDd: 4.5 cm  LVIDs: 1.6 cm  LVPWd: 1.2 cm  FS: 63.2 %  LV mass(C)d: 205.5 grams  LV mass(C)dI: 121.4 grams/m2  Ao root diam: 3.7 cm  LA dimension: 3.9 cm  asc Aorta Diam: 4.2 cm  LA/Ao: 1.0  LVOT diam: 2.0 cm  LVOT area: 3.1 cm2  LA Volume (BP): 116.0 ml  LA Volume Index (BP): 68.6 ml/m2  RWT: 0.54     Doppler  Measurements & Calculations  MV E max galindo: 119.0 cm/sec  MV A max galindo: 157.9 cm/sec  MV E/A: 0.75  MV max P.0 mmHg  MV mean PG: 3.8 mmHg  MV V2 VTI: 54.2 cm  MVA(VTI): 2.0 cm2  MV dec time: 0.27 sec  Ao V2 max: 259.4 cm/sec  Ao max P.0 mmHg  Ao V2 mean: 192.5 cm/sec  Ao mean P.0 mmHg  Ao V2 VTI: 66.4 cm  KAREN(I,D): 1.6 cm2  KAREN(V,D): 1.7 cm2  LV V1 max P.3 mmHg  LV V1 max: 144.5 cm/sec  LV V1 VTI: 35.5 cm  SV(LVOT): 108.8 ml  SI(LVOT): 64.3 ml/m2  PA acc time: 0.10 sec  TR max galindo: 306.8 cm/sec  TR max P.6 mmHg  AV Galindo Ratio (DI): 0.56  KAREN Index (cm2/m2): 0.97  E/E' av.9  Lateral E/e': 25.6  Medial E/e': 30.2     ______________________________________________________________________________  Report approved by: Crista Moser 2021 02:35 PM

## 2022-07-15 NOTE — PHARMACY-VANCOMYCIN DOSING SERVICE
"Pharmacy Vancomycin Note  Date of Service 2021  Patient's  1940   81 year old, male    Indication: Bacteremia  Day of Therapy: 2  Current vancomycin regimen:  1000 mg IV q48h  Current vancomycin monitoring method: AUC  Current vancomycin therapeutic monitoring goal: 400-600 mg*h/L    Current estimated CrCl = Estimated Creatinine Clearance: 15.4 mL/min (A) (based on SCr of 2.86 mg/dL (H)).    Creatinine for last 3 days  2021:  7:27 AM Creatinine 2.88 mg/dL  2021:  5:55 AM Creatinine 2.86 mg/dL    Recent Vancomycin Levels (past 3 days)  2021:  5:55 AM Vancomycin 10.6 mg/L    Vancomycin IV Administrations (past 72 hours)                   vancomycin (VANCOCIN) 1000 mg in dextrose 5% 200 mL PREMIX (mg) 1,000 mg New Bag 21 0956                Nephrotoxins and other renal medications (From now, onward)    Start     Dose/Rate Route Frequency Ordered Stop    21 1400  piperacillin-tazobactam (ZOSYN) 2.25 g vial to attach to  ml bag     Note to Pharmacy: For [facility], [facility] and [facility]: For Zosyn-naive patients, use the \"Zosyn initial dose + extended infusion\" order panel.    2.25 g  over 30 Minutes Intravenous EVERY 6 HOURS 21 1311               Contrast Orders - past 72 hours (72h ago, onward)    None          Interpretation of levels and current regimen:  Vancomycin level is reflective of AUC greater than 600    Has serum creatinine changed greater than 50% in last 72 hours: No    Urine output:  good urine output    Renal Function: Stable    Loading dose: N/A  Regimen: 750 mg IV every 48 hours.  Start time: 10:00 on 2021  Exposure target: AUC24 (range)400-600 mg/L.hr   AUC24,ss: 466 mg/L.hr  Probability of AUC24 > 400: 77 %  Ctrough,ss: 15.2 mg/L  Probability of Ctrough,ss > 20: 14 %  Probability of nephrotoxicity (Lodise JAYSHREE ): 10 %    Plan:  1. Decrease Dose to Vancomycin 750 mg IV q48h  2. Vancomycin monitoring method: AUC  3. Vancomycin therapeutic monitoring " Spoke to patient and reviewed information below. He was understanding. He will increase depakote to 1000 mg daily. He will try and get a hold of his psychiatrist to update her on the increase in dose. He wanted to let Dr. Juvencio Logan know that he re-started primidone 50 mg BID for tremors. He has not made him dizzy as it did before and has been helping with tremors. Dr. Juvencio Logan updated on this. goal: 400-600 mg*h/L  4. Pharmacy will check vancomycin levels as appropriate in 1-3 Days.  5. Serum creatinine levels will be ordered daily for the first week of therapy and at least twice weekly for subsequent weeks.    Kath Vincent RPH